# Patient Record
Sex: FEMALE | Race: WHITE | NOT HISPANIC OR LATINO
[De-identification: names, ages, dates, MRNs, and addresses within clinical notes are randomized per-mention and may not be internally consistent; named-entity substitution may affect disease eponyms.]

---

## 2017-01-04 ENCOUNTER — APPOINTMENT (OUTPATIENT)
Dept: RHEUMATOLOGY | Facility: CLINIC | Age: 67
End: 2017-01-04

## 2017-01-04 VITALS
SYSTOLIC BLOOD PRESSURE: 120 MMHG | HEIGHT: 64 IN | WEIGHT: 181 LBS | DIASTOLIC BLOOD PRESSURE: 70 MMHG | BODY MASS INDEX: 30.9 KG/M2

## 2017-01-04 RX ORDER — METHYLPRED ACET/NACL,ISO-OS/PF 80 MG/ML
80 VIAL (ML) INJECTION
Qty: 1 | Refills: 0 | Status: COMPLETED | OUTPATIENT
Start: 2017-01-04

## 2017-01-04 RX ADMIN — METHYLPREDNISOLONE ACETATE MG/ML: 80 INJECTION, SUSPENSION INTRA-ARTICULAR; INTRALESIONAL; INTRAMUSCULAR; SOFT TISSUE at 00:00

## 2017-01-06 ENCOUNTER — MED ADMIN CHARGE (OUTPATIENT)
Age: 67
End: 2017-01-06

## 2017-03-24 ENCOUNTER — APPOINTMENT (OUTPATIENT)
Dept: RHEUMATOLOGY | Facility: CLINIC | Age: 67
End: 2017-03-24

## 2017-03-24 VITALS
OXYGEN SATURATION: 98 % | DIASTOLIC BLOOD PRESSURE: 66 MMHG | SYSTOLIC BLOOD PRESSURE: 108 MMHG | HEIGHT: 64 IN | TEMPERATURE: 98.4 F | HEART RATE: 79 BPM

## 2017-03-24 RX ORDER — METHYLPRED ACET/NACL,ISO-OS/PF 80 MG/ML
80 VIAL (ML) INJECTION
Qty: 1 | Refills: 0 | Status: COMPLETED | OUTPATIENT
Start: 2017-03-24

## 2017-03-24 RX ADMIN — METHYLPREDNISOLONE ACETATE MG/ML: 80 INJECTION, SUSPENSION INTRA-ARTICULAR; INTRALESIONAL; INTRAMUSCULAR; SOFT TISSUE at 00:00

## 2017-04-06 ENCOUNTER — OTHER (OUTPATIENT)
Age: 67
End: 2017-04-06

## 2017-04-28 ENCOUNTER — APPOINTMENT (OUTPATIENT)
Dept: RHEUMATOLOGY | Facility: CLINIC | Age: 67
End: 2017-04-28

## 2017-04-28 VITALS
WEIGHT: 170 LBS | HEART RATE: 86 BPM | DIASTOLIC BLOOD PRESSURE: 72 MMHG | OXYGEN SATURATION: 94 % | TEMPERATURE: 97.7 F | HEIGHT: 64 IN | SYSTOLIC BLOOD PRESSURE: 122 MMHG | BODY MASS INDEX: 29.02 KG/M2

## 2017-04-28 LAB
25(OH)D3 SERPL-MCNC: 56.4 NG/ML
ALBUMIN SERPL ELPH-MCNC: 4.3 G/DL
ALP BLD-CCNC: 84 U/L
ALT SERPL-CCNC: 9 U/L
ANION GAP SERPL CALC-SCNC: 11 MMOL/L
AST SERPL-CCNC: 22 U/L
BASOPHILS # BLD AUTO: 0.02 K/UL
BASOPHILS NFR BLD AUTO: 0.3 %
BILIRUB SERPL-MCNC: 0.2 MG/DL
BUN SERPL-MCNC: 20 MG/DL
CALCIUM SERPL-MCNC: 10.1 MG/DL
CHLORIDE SERPL-SCNC: 99 MMOL/L
CO2 SERPL-SCNC: 32 MMOL/L
CREAT SERPL-MCNC: 0.76 MG/DL
EOSINOPHIL # BLD AUTO: 0.11 K/UL
EOSINOPHIL NFR BLD AUTO: 1.8 %
GLUCOSE SERPL-MCNC: 100 MG/DL
HCT VFR BLD CALC: 42.5 %
HGB BLD-MCNC: 13.7 G/DL
IMM GRANULOCYTES NFR BLD AUTO: 0.2 %
LYMPHOCYTES # BLD AUTO: 2.08 K/UL
LYMPHOCYTES NFR BLD AUTO: 33.2 %
MAN DIFF?: NORMAL
MCHC RBC-ENTMCNC: 30.9 PG
MCHC RBC-ENTMCNC: 32.2 GM/DL
MCV RBC AUTO: 95.9 FL
MONOCYTES # BLD AUTO: 0.63 K/UL
MONOCYTES NFR BLD AUTO: 10.1 %
NEUTROPHILS # BLD AUTO: 3.41 K/UL
NEUTROPHILS NFR BLD AUTO: 54.4 %
PLATELET # BLD AUTO: 311 K/UL
POTASSIUM SERPL-SCNC: 3.9 MMOL/L
PROT SERPL-MCNC: 7.2 G/DL
RBC # BLD: 4.43 M/UL
RBC # FLD: 13.2 %
SODIUM SERPL-SCNC: 142 MMOL/L
WBC # FLD AUTO: 6.26 K/UL

## 2017-04-28 RX ORDER — HYALURONATE SODIUM 10 MG/ML
25 SYRINGE (ML) INTRAARTICULAR
Refills: 0 | Status: COMPLETED | OUTPATIENT
Start: 2017-04-28

## 2017-04-28 RX ADMIN — Medication 2.5 MG/2.5ML: at 00:00

## 2017-05-05 ENCOUNTER — APPOINTMENT (OUTPATIENT)
Dept: RHEUMATOLOGY | Facility: CLINIC | Age: 67
End: 2017-05-05

## 2017-05-05 VITALS
HEART RATE: 82 BPM | SYSTOLIC BLOOD PRESSURE: 140 MMHG | DIASTOLIC BLOOD PRESSURE: 70 MMHG | BODY MASS INDEX: 29.02 KG/M2 | WEIGHT: 170 LBS | HEIGHT: 64 IN | OXYGEN SATURATION: 96 %

## 2017-05-05 RX ORDER — DENOSUMAB 60 MG/ML
60 INJECTION SUBCUTANEOUS
Qty: 1 | Refills: 0 | Status: COMPLETED | OUTPATIENT
Start: 2017-05-05

## 2017-05-05 RX ORDER — HYALURONATE SODIUM 10 MG/ML
25 SYRINGE (ML) INTRAARTICULAR
Refills: 0 | Status: COMPLETED | OUTPATIENT
Start: 2017-05-05

## 2017-05-05 RX ADMIN — Medication 2.5 MG/2.5ML: at 00:00

## 2017-05-05 RX ADMIN — DENOSUMAB 0 MG/ML: 60 INJECTION SUBCUTANEOUS at 00:00

## 2017-05-12 ENCOUNTER — APPOINTMENT (OUTPATIENT)
Dept: RHEUMATOLOGY | Facility: CLINIC | Age: 67
End: 2017-05-12

## 2017-05-12 VITALS
DIASTOLIC BLOOD PRESSURE: 64 MMHG | HEIGHT: 64 IN | BODY MASS INDEX: 29.37 KG/M2 | OXYGEN SATURATION: 97 % | WEIGHT: 172 LBS | HEART RATE: 94 BPM | SYSTOLIC BLOOD PRESSURE: 92 MMHG

## 2017-05-12 RX ORDER — HYALURONATE SODIUM 10 MG/ML
25 SYRINGE (ML) INTRAARTICULAR
Refills: 0 | Status: COMPLETED | OUTPATIENT
Start: 2017-05-12

## 2017-05-12 RX ORDER — METHYLPRED ACET/NACL,ISO-OS/PF 80 MG/ML
80 VIAL (ML) INJECTION
Qty: 1 | Refills: 0 | Status: COMPLETED | OUTPATIENT
Start: 2017-05-12

## 2017-05-12 RX ADMIN — Medication 2.5 MG/2.5ML: at 00:00

## 2017-05-12 RX ADMIN — METHYLPREDNISOLONE ACETATE MG/ML: 80 INJECTION, SUSPENSION INTRA-ARTICULAR; INTRALESIONAL; INTRAMUSCULAR; SOFT TISSUE at 00:00

## 2017-06-26 ENCOUNTER — APPOINTMENT (OUTPATIENT)
Dept: RHEUMATOLOGY | Facility: CLINIC | Age: 67
End: 2017-06-26

## 2017-06-26 VITALS
BODY MASS INDEX: 29.37 KG/M2 | SYSTOLIC BLOOD PRESSURE: 114 MMHG | HEART RATE: 92 BPM | HEIGHT: 64 IN | WEIGHT: 172 LBS | DIASTOLIC BLOOD PRESSURE: 72 MMHG | OXYGEN SATURATION: 97 %

## 2017-06-26 RX ORDER — AZELASTINE HYDROCHLORIDE 137 UG/1
0.1 SPRAY, METERED NASAL
Qty: 90 | Refills: 0 | Status: DISCONTINUED | COMMUNITY
Start: 2017-05-12

## 2017-06-26 RX ORDER — HYDROMORPHONE HYDROCHLORIDE 2 MG/1
2 TABLET ORAL
Qty: 15 | Refills: 0 | Status: DISCONTINUED | COMMUNITY
Start: 2017-01-31

## 2017-06-26 RX ORDER — OXYCODONE AND ACETAMINOPHEN 5; 325 MG/1; MG/1
5-325 TABLET ORAL
Qty: 60 | Refills: 0 | Status: DISCONTINUED | COMMUNITY
Start: 2016-12-29

## 2017-06-26 RX ORDER — METHYLPRED ACET/NACL,ISO-OS/PF 80 MG/ML
80 VIAL (ML) INJECTION
Qty: 1 | Refills: 0 | Status: COMPLETED | OUTPATIENT
Start: 2017-06-26

## 2017-06-26 RX ORDER — AMOXICILLIN 500 MG/1
500 CAPSULE ORAL
Qty: 14 | Refills: 0 | Status: DISCONTINUED | COMMUNITY
Start: 2017-06-01

## 2017-06-26 RX ORDER — CEFUROXIME AXETIL 500 MG/1
500 TABLET ORAL
Qty: 14 | Refills: 0 | Status: DISCONTINUED | COMMUNITY
Start: 2017-06-10

## 2017-06-26 RX ADMIN — METHYLPREDNISOLONE ACETATE MG/ML: 80 INJECTION, SUSPENSION INTRA-ARTICULAR; INTRALESIONAL; INTRAMUSCULAR; SOFT TISSUE at 00:00

## 2017-08-02 ENCOUNTER — APPOINTMENT (OUTPATIENT)
Dept: RHEUMATOLOGY | Facility: CLINIC | Age: 67
End: 2017-08-02
Payer: MEDICARE

## 2017-08-02 VITALS — OXYGEN SATURATION: 98 % | SYSTOLIC BLOOD PRESSURE: 112 MMHG | HEART RATE: 110 BPM | DIASTOLIC BLOOD PRESSURE: 72 MMHG

## 2017-08-02 VITALS — HEART RATE: 78 BPM | SYSTOLIC BLOOD PRESSURE: 114 MMHG | OXYGEN SATURATION: 98 % | DIASTOLIC BLOOD PRESSURE: 68 MMHG

## 2017-08-02 PROCEDURE — 20610 DRAIN/INJ JOINT/BURSA W/O US: CPT | Mod: 50

## 2017-08-02 RX ORDER — METHYLPRED ACET/NACL,ISO-OS/PF 80 MG/ML
80 VIAL (ML) INJECTION
Qty: 1 | Refills: 0 | Status: COMPLETED | OUTPATIENT
Start: 2017-08-02

## 2017-08-02 RX ADMIN — METHYLPREDNISOLONE ACETATE MG/ML: 80 INJECTION, SUSPENSION INTRA-ARTICULAR; INTRALESIONAL; INTRAMUSCULAR; SOFT TISSUE at 00:00

## 2017-09-25 ENCOUNTER — APPOINTMENT (OUTPATIENT)
Dept: RHEUMATOLOGY | Facility: CLINIC | Age: 67
End: 2017-09-25
Payer: MEDICARE

## 2017-09-25 VITALS
OXYGEN SATURATION: 97 % | HEIGHT: 64 IN | HEART RATE: 80 BPM | DIASTOLIC BLOOD PRESSURE: 74 MMHG | WEIGHT: 172 LBS | SYSTOLIC BLOOD PRESSURE: 132 MMHG | BODY MASS INDEX: 29.37 KG/M2

## 2017-09-25 PROCEDURE — 20610 DRAIN/INJ JOINT/BURSA W/O US: CPT | Mod: 50

## 2017-09-25 RX ORDER — METHYLPRED ACET/NACL,ISO-OS/PF 80 MG/ML
80 VIAL (ML) INJECTION
Qty: 1 | Refills: 0 | Status: COMPLETED | OUTPATIENT
Start: 2017-09-25

## 2017-09-25 RX ADMIN — METHYLPREDNISOLONE ACETATE MG/ML: 80 INJECTION, SUSPENSION INTRA-ARTICULAR; INTRALESIONAL; INTRAMUSCULAR; SOFT TISSUE at 00:00

## 2017-09-28 ENCOUNTER — OTHER (OUTPATIENT)
Age: 67
End: 2017-09-28

## 2017-10-20 ENCOUNTER — RX RENEWAL (OUTPATIENT)
Age: 67
End: 2017-10-20

## 2017-11-13 ENCOUNTER — APPOINTMENT (OUTPATIENT)
Dept: RHEUMATOLOGY | Facility: CLINIC | Age: 67
End: 2017-11-13
Payer: MEDICARE

## 2017-11-13 VITALS
DIASTOLIC BLOOD PRESSURE: 72 MMHG | OXYGEN SATURATION: 98 % | SYSTOLIC BLOOD PRESSURE: 136 MMHG | HEIGHT: 64 IN | HEART RATE: 70 BPM

## 2017-11-13 PROCEDURE — 20610 DRAIN/INJ JOINT/BURSA W/O US: CPT | Mod: 50

## 2017-11-13 RX ORDER — SODIUM HYALURONATE INTRA-ARTICULAR SOLN PREF SYR 25 MG/2.5ML 25/2.5 MG/ML
25 SOLUTION PREFILLED SYRINGE INTRAARTICULAR
Refills: 0 | Status: COMPLETED | OUTPATIENT
Start: 2017-11-13

## 2017-11-13 RX ORDER — METHYLPRED ACET/NACL,ISO-OS/PF 80 MG/ML
80 VIAL (ML) INJECTION
Qty: 1 | Refills: 0 | Status: COMPLETED | OUTPATIENT
Start: 2017-11-13

## 2017-11-13 RX ADMIN — SODIUM HYALURONATE INTRA-ARTICULAR SOLN PREF SYR 25 MG/2.5ML 0 MG/2.5ML: 25/2.5 SOLUTION PREFILLED SYRINGE at 00:00

## 2017-11-13 RX ADMIN — METHYLPREDNISOLONE ACETATE MG/ML: 80 INJECTION, SUSPENSION INTRA-ARTICULAR; INTRALESIONAL; INTRAMUSCULAR; SOFT TISSUE at 00:00

## 2017-11-20 ENCOUNTER — APPOINTMENT (OUTPATIENT)
Dept: RHEUMATOLOGY | Facility: CLINIC | Age: 67
End: 2017-11-20
Payer: MEDICARE

## 2017-11-20 VITALS
HEART RATE: 68 BPM | DIASTOLIC BLOOD PRESSURE: 77 MMHG | BODY MASS INDEX: 29.53 KG/M2 | SYSTOLIC BLOOD PRESSURE: 115 MMHG | WEIGHT: 173 LBS | HEIGHT: 64 IN

## 2017-11-20 PROCEDURE — 20610 DRAIN/INJ JOINT/BURSA W/O US: CPT

## 2017-11-20 RX ORDER — SODIUM HYALURONATE INTRA-ARTICULAR SOLN PREF SYR 25 MG/2.5ML 25/2.5 MG/ML
25 SOLUTION PREFILLED SYRINGE INTRAARTICULAR
Refills: 0 | Status: COMPLETED | OUTPATIENT
Start: 2017-11-20

## 2017-11-20 RX ADMIN — SODIUM HYALURONATE INTRA-ARTICULAR SOLN PREF SYR 25 MG/2.5ML 0 MG/2.5ML: 25/2.5 SOLUTION PREFILLED SYRINGE at 00:00

## 2017-11-27 ENCOUNTER — APPOINTMENT (OUTPATIENT)
Dept: RHEUMATOLOGY | Facility: CLINIC | Age: 67
End: 2017-11-27
Payer: MEDICARE

## 2017-11-27 VITALS
BODY MASS INDEX: 29.53 KG/M2 | SYSTOLIC BLOOD PRESSURE: 122 MMHG | DIASTOLIC BLOOD PRESSURE: 72 MMHG | HEIGHT: 64 IN | HEART RATE: 61 BPM | OXYGEN SATURATION: 98 % | WEIGHT: 173 LBS

## 2017-11-27 PROCEDURE — 20610 DRAIN/INJ JOINT/BURSA W/O US: CPT | Mod: 50

## 2017-11-27 PROCEDURE — 96372 THER/PROPH/DIAG INJ SC/IM: CPT | Mod: 59

## 2017-11-27 RX ORDER — DENOSUMAB 60 MG/ML
60 INJECTION SUBCUTANEOUS
Qty: 0 | Refills: 0 | Status: COMPLETED | OUTPATIENT
Start: 2017-11-27

## 2017-11-27 RX ORDER — SODIUM HYALURONATE INTRA-ARTICULAR SOLN PREF SYR 25 MG/2.5ML 25/2.5 MG/ML
25 SOLUTION PREFILLED SYRINGE INTRAARTICULAR
Refills: 0 | Status: COMPLETED | OUTPATIENT
Start: 2017-11-27

## 2017-11-27 RX ADMIN — DENOSUMAB 0 MG/ML: 60 INJECTION SUBCUTANEOUS at 00:00

## 2017-11-27 RX ADMIN — SODIUM HYALURONATE INTRA-ARTICULAR SOLN PREF SYR 25 MG/2.5ML 2.5 MG/2.5ML: 25/2.5 SOLUTION PREFILLED SYRINGE at 00:00

## 2017-12-20 ENCOUNTER — APPOINTMENT (OUTPATIENT)
Dept: RHEUMATOLOGY | Facility: CLINIC | Age: 67
End: 2017-12-20
Payer: MEDICARE

## 2017-12-20 VITALS
OXYGEN SATURATION: 97 % | HEIGHT: 64 IN | TEMPERATURE: 97.8 F | SYSTOLIC BLOOD PRESSURE: 116 MMHG | DIASTOLIC BLOOD PRESSURE: 74 MMHG | HEART RATE: 69 BPM | BODY MASS INDEX: 29.53 KG/M2 | WEIGHT: 173 LBS

## 2017-12-20 PROCEDURE — 20610 DRAIN/INJ JOINT/BURSA W/O US: CPT | Mod: LT

## 2017-12-20 RX ADMIN — METHYLPREDNISOLONE ACETATE MG/ML: 80 INJECTION, SUSPENSION INTRA-ARTICULAR; INTRALESIONAL; INTRAMUSCULAR; SOFT TISSUE at 00:00

## 2017-12-27 RX ORDER — METHYLPRED ACET/NACL,ISO-OS/PF 80 MG/ML
80 VIAL (ML) INJECTION
Qty: 1 | Refills: 0 | Status: COMPLETED | OUTPATIENT
Start: 2017-12-20

## 2018-01-26 ENCOUNTER — APPOINTMENT (OUTPATIENT)
Dept: RHEUMATOLOGY | Facility: CLINIC | Age: 68
End: 2018-01-26
Payer: MEDICARE

## 2018-01-26 VITALS
WEIGHT: 173 LBS | DIASTOLIC BLOOD PRESSURE: 71 MMHG | BODY MASS INDEX: 29.53 KG/M2 | HEART RATE: 94 BPM | HEIGHT: 64 IN | SYSTOLIC BLOOD PRESSURE: 107 MMHG | OXYGEN SATURATION: 97 %

## 2018-01-26 PROCEDURE — 20610 DRAIN/INJ JOINT/BURSA W/O US: CPT | Mod: 50

## 2018-01-26 RX ORDER — METHYLPRED ACET/NACL,ISO-OS/PF 80 MG/ML
80 VIAL (ML) INJECTION
Qty: 1 | Refills: 0 | Status: COMPLETED | OUTPATIENT
Start: 2018-01-26

## 2018-01-26 RX ADMIN — METHYLPREDNISOLONE ACETATE MG/ML: 80 INJECTION, SUSPENSION INTRA-ARTICULAR; INTRALESIONAL; INTRAMUSCULAR; SOFT TISSUE at 00:00

## 2018-02-07 ENCOUNTER — MESSAGE (OUTPATIENT)
Age: 68
End: 2018-02-07

## 2018-04-06 ENCOUNTER — MESSAGE (OUTPATIENT)
Age: 68
End: 2018-04-06

## 2018-04-07 ENCOUNTER — TRANSCRIPTION ENCOUNTER (OUTPATIENT)
Age: 68
End: 2018-04-07

## 2018-04-13 ENCOUNTER — APPOINTMENT (OUTPATIENT)
Dept: UROGYNECOLOGY | Facility: CLINIC | Age: 68
End: 2018-04-13
Payer: MEDICARE

## 2018-04-13 VITALS
DIASTOLIC BLOOD PRESSURE: 68 MMHG | BODY MASS INDEX: 33.13 KG/M2 | SYSTOLIC BLOOD PRESSURE: 124 MMHG | WEIGHT: 187 LBS | HEIGHT: 63 IN

## 2018-04-13 DIAGNOSIS — Z80.1 FAMILY HISTORY OF MALIGNANT NEOPLASM OF TRACHEA, BRONCHUS AND LUNG: ICD-10-CM

## 2018-04-13 DIAGNOSIS — Z82.49 FAMILY HISTORY OF ISCHEMIC HEART DISEASE AND OTHER DISEASES OF THE CIRCULATORY SYSTEM: ICD-10-CM

## 2018-04-13 DIAGNOSIS — Z87.42 PERSONAL HISTORY OF OTHER DISEASES OF THE FEMALE GENITAL TRACT: ICD-10-CM

## 2018-04-13 DIAGNOSIS — I10 ESSENTIAL (PRIMARY) HYPERTENSION: ICD-10-CM

## 2018-04-13 DIAGNOSIS — Z82.3 FAMILY HISTORY OF STROKE: ICD-10-CM

## 2018-04-13 DIAGNOSIS — Z86.018 PERSONAL HISTORY OF OTHER BENIGN NEOPLASM: ICD-10-CM

## 2018-04-13 DIAGNOSIS — Z78.9 OTHER SPECIFIED HEALTH STATUS: ICD-10-CM

## 2018-04-13 DIAGNOSIS — Z87.828 PERSONAL HISTORY OF OTHER (HEALED) PHYSICAL INJURY AND TRAUMA: ICD-10-CM

## 2018-04-13 DIAGNOSIS — Z80.0 FAMILY HISTORY OF MALIGNANT NEOPLASM OF DIGESTIVE ORGANS: ICD-10-CM

## 2018-04-13 LAB
BILIRUB UR QL STRIP: NEGATIVE
CLARITY UR: CLEAR
COLLECTION METHOD: NORMAL
GLUCOSE UR-MCNC: NEGATIVE
HCG UR QL: 0.2 EU/DL
HGB UR QL STRIP.AUTO: NEGATIVE
KETONES UR-MCNC: NEGATIVE
LEUKOCYTE ESTERASE UR QL STRIP: NEGATIVE
NITRITE UR QL STRIP: NEGATIVE
PH UR STRIP: 5.5
PROT UR STRIP-MCNC: NEGATIVE
SP GR UR STRIP: 1.01

## 2018-04-13 PROCEDURE — 51701 INSERT BLADDER CATHETER: CPT

## 2018-04-13 PROCEDURE — 99204 OFFICE O/P NEW MOD 45 MIN: CPT | Mod: 25

## 2018-04-13 PROCEDURE — 81003 URINALYSIS AUTO W/O SCOPE: CPT | Mod: QW

## 2018-04-13 RX ORDER — BACILLUS COAGULANS/INULIN 1B-250 MG
CAPSULE ORAL
Refills: 0 | Status: ACTIVE | COMMUNITY

## 2018-04-16 ENCOUNTER — RESULT REVIEW (OUTPATIENT)
Age: 68
End: 2018-04-16

## 2018-04-18 ENCOUNTER — APPOINTMENT (OUTPATIENT)
Dept: RHEUMATOLOGY | Facility: CLINIC | Age: 68
End: 2018-04-18
Payer: MEDICARE

## 2018-04-18 VITALS
HEIGHT: 63 IN | OXYGEN SATURATION: 98 % | DIASTOLIC BLOOD PRESSURE: 77 MMHG | BODY MASS INDEX: 31.01 KG/M2 | WEIGHT: 175 LBS | HEART RATE: 90 BPM | SYSTOLIC BLOOD PRESSURE: 118 MMHG

## 2018-04-18 PROCEDURE — 20610 DRAIN/INJ JOINT/BURSA W/O US: CPT | Mod: 50

## 2018-04-18 RX ORDER — METHENAMINE HIPPURATE 1 G/1
1 TABLET ORAL
Qty: 60 | Refills: 0 | Status: DISCONTINUED | COMMUNITY
Start: 2018-01-25

## 2018-04-18 RX ORDER — CEFPODOXIME PROXETIL 200 MG/1
200 TABLET, FILM COATED ORAL
Qty: 14 | Refills: 0 | Status: DISCONTINUED | COMMUNITY
Start: 2018-02-07

## 2018-04-18 RX ORDER — NITROFURANTOIN (MONOHYDRATE/MACROCRYSTALS) 25; 75 MG/1; MG/1
100 CAPSULE ORAL
Qty: 14 | Refills: 0 | Status: DISCONTINUED | COMMUNITY
Start: 2018-03-30

## 2018-05-17 ENCOUNTER — MOBILE ON CALL (OUTPATIENT)
Age: 68
End: 2018-05-17

## 2018-05-18 ENCOUNTER — APPOINTMENT (OUTPATIENT)
Dept: RHEUMATOLOGY | Facility: CLINIC | Age: 68
End: 2018-05-18
Payer: MEDICARE

## 2018-05-18 ENCOUNTER — MED ADMIN CHARGE (OUTPATIENT)
Age: 68
End: 2018-05-18

## 2018-05-18 VITALS
SYSTOLIC BLOOD PRESSURE: 133 MMHG | OXYGEN SATURATION: 90 % | HEART RATE: 85 BPM | WEIGHT: 175 LBS | HEIGHT: 63 IN | DIASTOLIC BLOOD PRESSURE: 78 MMHG | BODY MASS INDEX: 31.01 KG/M2

## 2018-05-18 DIAGNOSIS — M70.61 TROCHANTERIC BURSITIS, RIGHT HIP: ICD-10-CM

## 2018-05-18 PROCEDURE — 20610 DRAIN/INJ JOINT/BURSA W/O US: CPT | Mod: RT

## 2018-05-18 RX ORDER — METHYLPRED ACET/NACL,ISO-OS/PF 80 MG/ML
80 VIAL (ML) INJECTION
Qty: 1 | Refills: 0 | Status: COMPLETED | OUTPATIENT
Start: 2018-05-18

## 2018-05-18 RX ADMIN — METHYLPREDNISOLONE ACETATE MG/ML: 80 INJECTION, SUSPENSION INTRA-ARTICULAR; INTRALESIONAL; INTRAMUSCULAR; SOFT TISSUE at 00:00

## 2018-06-04 ENCOUNTER — APPOINTMENT (OUTPATIENT)
Dept: RHEUMATOLOGY | Facility: CLINIC | Age: 68
End: 2018-06-04
Payer: MEDICARE

## 2018-06-04 VITALS
HEART RATE: 69 BPM | DIASTOLIC BLOOD PRESSURE: 74 MMHG | WEIGHT: 175 LBS | SYSTOLIC BLOOD PRESSURE: 130 MMHG | OXYGEN SATURATION: 97 % | BODY MASS INDEX: 31.01 KG/M2 | HEIGHT: 63 IN

## 2018-06-04 PROCEDURE — 99213 OFFICE O/P EST LOW 20 MIN: CPT | Mod: 25

## 2018-06-04 PROCEDURE — 20610 DRAIN/INJ JOINT/BURSA W/O US: CPT | Mod: 50

## 2018-06-04 RX ORDER — SODIUM HYALURONATE INTRA-ARTICULAR SOLN PREF SYR 25 MG/2.5ML 25/2.5 MG/ML
25 SOLUTION PREFILLED SYRINGE INTRAARTICULAR
Refills: 0 | Status: COMPLETED | OUTPATIENT
Start: 2018-06-04

## 2018-06-04 RX ADMIN — SODIUM HYALURONATE INTRA-ARTICULAR SOLN PREF SYR 25 MG/2.5ML 2.5 MG/2.5ML: 25/2.5 SOLUTION PREFILLED SYRINGE at 00:00

## 2018-06-11 ENCOUNTER — APPOINTMENT (OUTPATIENT)
Dept: RHEUMATOLOGY | Facility: CLINIC | Age: 68
End: 2018-06-11
Payer: MEDICARE

## 2018-06-11 VITALS
HEIGHT: 63 IN | WEIGHT: 175 LBS | DIASTOLIC BLOOD PRESSURE: 74 MMHG | BODY MASS INDEX: 31.01 KG/M2 | SYSTOLIC BLOOD PRESSURE: 140 MMHG

## 2018-06-11 PROCEDURE — 20610 DRAIN/INJ JOINT/BURSA W/O US: CPT | Mod: 50

## 2018-06-11 RX ORDER — SODIUM HYALURONATE INTRA-ARTICULAR SOLN PREF SYR 25 MG/2.5ML 25/2.5 MG/ML
25 SOLUTION PREFILLED SYRINGE INTRAARTICULAR
Refills: 0 | Status: COMPLETED | OUTPATIENT
Start: 2018-06-11

## 2018-06-11 RX ADMIN — SODIUM HYALURONATE INTRA-ARTICULAR SOLN PREF SYR 25 MG/2.5ML 0 MG/2.5ML: 25/2.5 SOLUTION PREFILLED SYRINGE at 00:00

## 2018-06-18 ENCOUNTER — APPOINTMENT (OUTPATIENT)
Dept: RHEUMATOLOGY | Facility: CLINIC | Age: 68
End: 2018-06-18
Payer: MEDICARE

## 2018-06-18 VITALS
OXYGEN SATURATION: 97 % | SYSTOLIC BLOOD PRESSURE: 160 MMHG | HEIGHT: 63 IN | DIASTOLIC BLOOD PRESSURE: 88 MMHG | HEART RATE: 80 BPM | BODY MASS INDEX: 31.01 KG/M2 | WEIGHT: 175 LBS

## 2018-06-18 PROCEDURE — 20610 DRAIN/INJ JOINT/BURSA W/O US: CPT | Mod: 50

## 2018-06-18 PROCEDURE — 96372 THER/PROPH/DIAG INJ SC/IM: CPT | Mod: 59

## 2018-06-18 RX ORDER — SODIUM HYALURONATE INTRA-ARTICULAR SOLN PREF SYR 25 MG/2.5ML 25/2.5 MG/ML
25 SOLUTION PREFILLED SYRINGE INTRAARTICULAR
Refills: 0 | Status: COMPLETED | OUTPATIENT
Start: 2018-06-18

## 2018-06-18 RX ORDER — DENOSUMAB 60 MG/ML
60 INJECTION SUBCUTANEOUS
Qty: 0 | Refills: 0 | Status: COMPLETED | OUTPATIENT
Start: 2018-06-18

## 2018-06-18 RX ADMIN — DENOSUMAB 0 MG/ML: 60 INJECTION SUBCUTANEOUS at 00:00

## 2018-06-18 RX ADMIN — SODIUM HYALURONATE INTRA-ARTICULAR SOLN PREF SYR 25 MG/2.5ML 2.5 MG/2.5ML: 25/2.5 SOLUTION PREFILLED SYRINGE at 00:00

## 2018-06-22 ENCOUNTER — RESULT CHARGE (OUTPATIENT)
Age: 68
End: 2018-06-22

## 2018-06-22 ENCOUNTER — APPOINTMENT (OUTPATIENT)
Dept: UROGYNECOLOGY | Facility: CLINIC | Age: 68
End: 2018-06-22
Payer: MEDICARE

## 2018-06-22 VITALS — HEART RATE: 81 BPM | DIASTOLIC BLOOD PRESSURE: 80 MMHG | SYSTOLIC BLOOD PRESSURE: 132 MMHG

## 2018-06-22 LAB
BILIRUB UR QL STRIP: NORMAL
CLARITY UR: CLEAR
COLLECTION METHOD: NORMAL
GLUCOSE UR-MCNC: NEGATIVE
HCG UR QL: 0.2 EU/DL
HGB UR QL STRIP.AUTO: NEGATIVE
KETONES UR-MCNC: NEGATIVE
LEUKOCYTE ESTERASE UR QL STRIP: NEGATIVE
NITRITE UR QL STRIP: NORMAL
PH UR STRIP: 7
PROT UR STRIP-MCNC: NEGATIVE
SP GR UR STRIP: 1.02

## 2018-06-22 PROCEDURE — 52000 CYSTOURETHROSCOPY: CPT

## 2018-06-22 PROCEDURE — 99213 OFFICE O/P EST LOW 20 MIN: CPT | Mod: 25

## 2018-06-26 ENCOUNTER — APPOINTMENT (OUTPATIENT)
Dept: RHEUMATOLOGY | Facility: CLINIC | Age: 68
End: 2018-06-26
Payer: MEDICARE

## 2018-06-26 VITALS
SYSTOLIC BLOOD PRESSURE: 114 MMHG | OXYGEN SATURATION: 98 % | HEART RATE: 92 BPM | DIASTOLIC BLOOD PRESSURE: 64 MMHG | BODY MASS INDEX: 31.01 KG/M2 | HEIGHT: 63 IN | WEIGHT: 175 LBS

## 2018-06-26 PROCEDURE — 20610 DRAIN/INJ JOINT/BURSA W/O US: CPT | Mod: 59,LT

## 2018-06-26 RX ORDER — METHYLPRED ACET/NACL,ISO-OS/PF 80 MG/ML
80 VIAL (ML) INJECTION
Qty: 1 | Refills: 0 | Status: COMPLETED | OUTPATIENT
Start: 2018-06-26

## 2018-06-26 RX ADMIN — METHYLPREDNISOLONE ACETATE MG/ML: 80 INJECTION, SUSPENSION INTRA-ARTICULAR; INTRALESIONAL; INTRAMUSCULAR; SOFT TISSUE at 00:00

## 2018-07-18 ENCOUNTER — APPOINTMENT (OUTPATIENT)
Dept: GASTROENTEROLOGY | Facility: CLINIC | Age: 68
End: 2018-07-18
Payer: MEDICARE

## 2018-07-18 VITALS
SYSTOLIC BLOOD PRESSURE: 130 MMHG | HEART RATE: 79 BPM | WEIGHT: 180 LBS | BODY MASS INDEX: 31.89 KG/M2 | HEIGHT: 63 IN | OXYGEN SATURATION: 98 % | DIASTOLIC BLOOD PRESSURE: 76 MMHG

## 2018-07-18 PROCEDURE — 99214 OFFICE O/P EST MOD 30 MIN: CPT

## 2018-09-17 ENCOUNTER — APPOINTMENT (OUTPATIENT)
Dept: RHEUMATOLOGY | Facility: CLINIC | Age: 68
End: 2018-09-17
Payer: MEDICARE

## 2018-09-17 VITALS
DIASTOLIC BLOOD PRESSURE: 80 MMHG | SYSTOLIC BLOOD PRESSURE: 162 MMHG | HEART RATE: 86 BPM | BODY MASS INDEX: 31.71 KG/M2 | WEIGHT: 179 LBS | HEIGHT: 63 IN | OXYGEN SATURATION: 98 %

## 2018-09-17 PROCEDURE — 99213 OFFICE O/P EST LOW 20 MIN: CPT | Mod: 25

## 2018-09-17 PROCEDURE — 20610 DRAIN/INJ JOINT/BURSA W/O US: CPT | Mod: LT

## 2018-09-17 RX ORDER — GABAPENTIN 100 MG/1
100 CAPSULE ORAL
Qty: 90 | Refills: 3 | Status: DISCONTINUED | COMMUNITY
Start: 2018-06-26 | End: 2018-09-17

## 2018-09-17 RX ORDER — PREDNISONE 5 MG/1
5 TABLET ORAL
Qty: 20 | Refills: 0 | Status: DISCONTINUED | COMMUNITY
Start: 2018-05-18 | End: 2018-09-17

## 2018-09-28 ENCOUNTER — APPOINTMENT (OUTPATIENT)
Dept: UROGYNECOLOGY | Facility: CLINIC | Age: 68
End: 2018-09-28
Payer: MEDICARE

## 2018-09-28 VITALS
DIASTOLIC BLOOD PRESSURE: 74 MMHG | SYSTOLIC BLOOD PRESSURE: 153 MMHG | WEIGHT: 179 LBS | HEIGHT: 63 IN | BODY MASS INDEX: 31.71 KG/M2

## 2018-09-28 LAB
BILIRUB UR QL STRIP: NEGATIVE
CLARITY UR: CLEAR
COLLECTION METHOD: NORMAL
GLUCOSE UR-MCNC: NEGATIVE
HCG UR QL: 0.2 EU/DL
HGB UR QL STRIP.AUTO: NEGATIVE
KETONES UR-MCNC: NEGATIVE
LEUKOCYTE ESTERASE UR QL STRIP: NEGATIVE
NITRITE UR QL STRIP: NEGATIVE
PH UR STRIP: 5.5
PROT UR STRIP-MCNC: NEGATIVE
SP GR UR STRIP: 1.02

## 2018-09-28 PROCEDURE — 81003 URINALYSIS AUTO W/O SCOPE: CPT | Mod: QW

## 2018-09-28 PROCEDURE — 51798 US URINE CAPACITY MEASURE: CPT

## 2018-09-28 PROCEDURE — 99213 OFFICE O/P EST LOW 20 MIN: CPT | Mod: 25

## 2018-12-19 ENCOUNTER — APPOINTMENT (OUTPATIENT)
Dept: RHEUMATOLOGY | Facility: CLINIC | Age: 68
End: 2018-12-19
Payer: MEDICARE

## 2018-12-19 VITALS
WEIGHT: 179 LBS | BODY MASS INDEX: 31.71 KG/M2 | HEIGHT: 63 IN | SYSTOLIC BLOOD PRESSURE: 122 MMHG | DIASTOLIC BLOOD PRESSURE: 79 MMHG | OXYGEN SATURATION: 99 % | HEART RATE: 91 BPM

## 2018-12-19 DIAGNOSIS — M85.80 OTHER SPECIFIED DISORDERS OF BONE DENSITY AND STRUCTURE, UNSPECIFIED SITE: ICD-10-CM

## 2018-12-19 PROCEDURE — 20610 DRAIN/INJ JOINT/BURSA W/O US: CPT | Mod: 50

## 2018-12-19 RX ORDER — SODIUM HYALURONATE INTRA-ARTICULAR SOLN PREF SYR 25 MG/2.5ML 25/2.5 MG/ML
25 SOLUTION PREFILLED SYRINGE INTRAARTICULAR
Refills: 0 | Status: COMPLETED | OUTPATIENT
Start: 2018-12-19

## 2018-12-19 RX ADMIN — SODIUM HYALURONATE INTRA-ARTICULAR SOLN PREF SYR 25 MG/2.5ML 0 MG/2.5ML: 25/2.5 SOLUTION PREFILLED SYRINGE at 00:00

## 2018-12-21 ENCOUNTER — OTHER (OUTPATIENT)
Age: 68
End: 2018-12-21

## 2018-12-21 RX ORDER — DENOSUMAB 60 MG/ML
60 INJECTION SUBCUTANEOUS
Qty: 1 | Refills: 0 | Status: DISCONTINUED | COMMUNITY
Start: 2017-03-24 | End: 2018-12-21

## 2018-12-26 ENCOUNTER — APPOINTMENT (OUTPATIENT)
Dept: RHEUMATOLOGY | Facility: CLINIC | Age: 68
End: 2018-12-26
Payer: MEDICARE

## 2018-12-26 VITALS
OXYGEN SATURATION: 98 % | HEART RATE: 87 BPM | WEIGHT: 179 LBS | SYSTOLIC BLOOD PRESSURE: 115 MMHG | HEIGHT: 63 IN | DIASTOLIC BLOOD PRESSURE: 74 MMHG | BODY MASS INDEX: 31.71 KG/M2

## 2018-12-26 PROCEDURE — 20610 DRAIN/INJ JOINT/BURSA W/O US: CPT | Mod: 50

## 2018-12-26 PROCEDURE — ZZZZZ: CPT

## 2018-12-26 PROCEDURE — 96374 THER/PROPH/DIAG INJ IV PUSH: CPT | Mod: 59

## 2018-12-26 RX ORDER — SODIUM HYALURONATE INTRA-ARTICULAR SOLN PREF SYR 25 MG/2.5ML 25/2.5 MG/ML
25 SOLUTION PREFILLED SYRINGE INTRAARTICULAR
Refills: 0 | Status: COMPLETED | OUTPATIENT
Start: 2018-12-26

## 2018-12-26 RX ADMIN — SODIUM HYALURONATE INTRA-ARTICULAR SOLN PREF SYR 25 MG/2.5ML 0 MG/2.5ML: 25/2.5 SOLUTION PREFILLED SYRINGE at 00:00

## 2019-01-02 ENCOUNTER — APPOINTMENT (OUTPATIENT)
Dept: RHEUMATOLOGY | Facility: CLINIC | Age: 69
End: 2019-01-02
Payer: MEDICARE

## 2019-01-02 VITALS
BODY MASS INDEX: 31.71 KG/M2 | OXYGEN SATURATION: 97 % | SYSTOLIC BLOOD PRESSURE: 120 MMHG | WEIGHT: 179 LBS | DIASTOLIC BLOOD PRESSURE: 70 MMHG | HEART RATE: 84 BPM | HEIGHT: 63 IN

## 2019-01-02 PROCEDURE — 20610 DRAIN/INJ JOINT/BURSA W/O US: CPT | Mod: 50

## 2019-01-02 RX ORDER — SODIUM HYALURONATE INTRA-ARTICULAR SOLN PREF SYR 25 MG/2.5ML 25/2.5 MG/ML
25 SOLUTION PREFILLED SYRINGE INTRAARTICULAR
Refills: 0 | Status: COMPLETED | OUTPATIENT
Start: 2019-01-02

## 2019-01-02 RX ADMIN — SODIUM HYALURONATE INTRA-ARTICULAR SOLN PREF SYR 25 MG/2.5ML 2.5 MG/2.5ML: 25/2.5 SOLUTION PREFILLED SYRINGE at 00:00

## 2019-01-07 ENCOUNTER — APPOINTMENT (OUTPATIENT)
Dept: RHEUMATOLOGY | Facility: CLINIC | Age: 69
End: 2019-01-07

## 2019-01-14 ENCOUNTER — APPOINTMENT (OUTPATIENT)
Dept: RHEUMATOLOGY | Facility: CLINIC | Age: 69
End: 2019-01-14

## 2019-01-18 ENCOUNTER — APPOINTMENT (OUTPATIENT)
Dept: RHEUMATOLOGY | Facility: CLINIC | Age: 69
End: 2019-01-18
Payer: MEDICARE

## 2019-01-18 VITALS
OXYGEN SATURATION: 97 % | SYSTOLIC BLOOD PRESSURE: 120 MMHG | WEIGHT: 180 LBS | DIASTOLIC BLOOD PRESSURE: 81 MMHG | HEART RATE: 97 BPM | BODY MASS INDEX: 31.89 KG/M2 | HEIGHT: 63 IN

## 2019-01-18 PROCEDURE — 20610 DRAIN/INJ JOINT/BURSA W/O US: CPT | Mod: LT,59

## 2019-01-18 NOTE — PROCEDURE
[Today's Date:] : Date: [unfilled] [Patient] : the patient [Risks] : risks [Benefits] : benefits [Alternatives] : alternatives [Consent Obtained] : written consent was obtained prior to the procedure and is detailed in the patient's record [Therapeutic] : therapeutic [#1 Site: ______] : #1 site identified in the [unfilled] [Ethyl Chloride] : ethyl chloride [___ ml Inj] : [unfilled] ~Uml [2%] : 2% [Betadine] : betadine solution [Alcohol] : alcohol [Chlorhexidine] : chlorhexidine [25 gauge 1.5 inch] : A 25 gauge 1.5 inch needle was used [Depomedrol ___ mg] : Depomedrol [unfilled] mg [Tolerated Well] : the patient tolerated the procedure well [No Complications] : there were no complications [Instructions Given] : handouts/patient instructions were given to patient [Patient Instructed to Call] : patient was instructed to call if redness at site, a decrease in range of motion or an increase in pain is noted after procedure. [#2 Site: ___] : # 2 site identified in the [unfilled]

## 2019-01-18 NOTE — REASON FOR VISIT
[Procedure: _________] : a [unfilled] procedure visit [FreeTextEntry1] : left knee and left trochanteric bursa

## 2019-01-18 NOTE — ASSESSMENT
[FreeTextEntry1] : Knee osteoarthritis-\par -I aspirated and injected the left knee with Depo-Medrol 80 mg x1.\par \par \par Left trochanter bursitis-\par -I injected the left trochanter bursa with Depo-Medrol 80 mg x1.\par \par To ice the area\par 2 call if symptoms worsen

## 2019-01-23 ENCOUNTER — APPOINTMENT (OUTPATIENT)
Dept: RHEUMATOLOGY | Facility: CLINIC | Age: 69
End: 2019-01-23

## 2019-04-19 ENCOUNTER — APPOINTMENT (OUTPATIENT)
Dept: RHEUMATOLOGY | Facility: CLINIC | Age: 69
End: 2019-04-19
Payer: MEDICARE

## 2019-04-19 VITALS
BODY MASS INDEX: 31.89 KG/M2 | HEIGHT: 63 IN | HEART RATE: 68 BPM | OXYGEN SATURATION: 97 % | DIASTOLIC BLOOD PRESSURE: 81 MMHG | WEIGHT: 180 LBS | SYSTOLIC BLOOD PRESSURE: 129 MMHG

## 2019-04-19 PROCEDURE — 20610 DRAIN/INJ JOINT/BURSA W/O US: CPT | Mod: 50

## 2019-04-19 NOTE — ASSESSMENT
[FreeTextEntry1] : 68-year-old female with polyarticular osteoarthritis. She presents today for bilateral steroid injections in her knees.\par \par Plan-\par -I aspirated and injected bilateral knees with Depo-Medrol 80 mg x2.\par -She tolerated the procedure well\par -To ice knees today\par -To call if pain persists

## 2019-04-19 NOTE — PROCEDURE
[Today's Date:] : Date: [unfilled] [Patient] : the patient [Risks] : risks [Benefits] : benefits [Alternatives] : alternatives [Therapeutic] : therapeutic [Consent Obtained] : written consent was obtained prior to the procedure and is detailed in the patient's record [#1 Site: ______] : #1 site identified in the [unfilled] [Ethyl Chloride] : ethyl chloride [2%] : 2% [___ ml Inj] : [unfilled] ~Uml [Betadine] : betadine solution [Alcohol] : alcohol [Chlorhexidine] : chlorhexidine [25 gauge 1.5 inch] : A 25 gauge 1.5 inch needle was used [Depomedrol ___ mg] : Depomedrol [unfilled] mg [Tolerated Well] : the patient tolerated the procedure well [No Complications] : there were no complications [Instructions Given] : handouts/patient instructions were given to patient [Patient Instructed to Call] : patient was instructed to call if redness at site, a decrease in range of motion or an increase in pain is noted after procedure.

## 2019-07-03 ENCOUNTER — APPOINTMENT (OUTPATIENT)
Dept: RHEUMATOLOGY | Facility: CLINIC | Age: 69
End: 2019-07-03
Payer: MEDICARE

## 2019-07-03 VITALS
HEART RATE: 76 BPM | DIASTOLIC BLOOD PRESSURE: 76 MMHG | TEMPERATURE: 97.8 F | SYSTOLIC BLOOD PRESSURE: 126 MMHG | WEIGHT: 180 LBS | BODY MASS INDEX: 31.89 KG/M2 | OXYGEN SATURATION: 93 % | HEIGHT: 63 IN

## 2019-07-03 PROCEDURE — 20610 DRAIN/INJ JOINT/BURSA W/O US: CPT | Mod: 50

## 2019-07-03 RX ORDER — SODIUM HYALURONATE INTRA-ARTICULAR SOLN PREF SYR 25 MG/2.5ML 25/2.5 MG/ML
25 SOLUTION PREFILLED SYRINGE INTRAARTICULAR
Refills: 0 | Status: COMPLETED | OUTPATIENT
Start: 2019-07-03

## 2019-07-03 RX ADMIN — SODIUM HYALURONATE INTRA-ARTICULAR SOLN PREF SYR 25 MG/2.5ML 0 MG/2.5ML: 25/2.5 SOLUTION PREFILLED SYRINGE at 00:00

## 2019-07-03 NOTE — ASSESSMENT
[FreeTextEntry1] : Knee osteoarthritis-\par -I aspirated and injected b/l knees with supartz x 2\par - to ice the knees\par - to call if she develops pain

## 2019-07-03 NOTE — PROCEDURE
[Today's Date:] : Date: [unfilled] [Patient] : the patient [Risks] : risks [Benefits] : benefits [Alternatives] : alternatives [Consent Obtained] : written consent was obtained prior to the procedure and is detailed in the patient's record [Therapeutic] : therapeutic [#1 Site: ______] : #1 site identified in the [unfilled] [Ethyl Chloride] : ethyl chloride [___ ml Inj] : [unfilled] ~Uml [2%] : 2% [Betadine] : betadine solution [Alcohol] : alcohol [Chlorhexidine] : chlorhexidine [25 gauge 1.5 inch] : A 25 gauge 1.5 inch needle was used [Tolerated Well] : the patient tolerated the procedure well [Instructions Given] : handouts/patient instructions were given to patient [No Complications] : there were no complications [Patient Instructed to Call] : patient was instructed to call if redness at site, a decrease in range of motion or an increase in pain is noted after procedure. [de-identified] : supartz

## 2019-07-03 NOTE — PROCEDURE
[Today's Date:] : Date: [unfilled] [Patient] : the patient [Risks] : risks [Benefits] : benefits [Alternatives] : alternatives [Consent Obtained] : written consent was obtained prior to the procedure and is detailed in the patient's record [#1 Site: ______] : #1 site identified in the [unfilled] [Therapeutic] : therapeutic [___ ml Inj] : [unfilled] ~Uml [Ethyl Chloride] : ethyl chloride [Alcohol] : alcohol [Betadine] : betadine solution [2%] : 2% [Chlorhexidine] : chlorhexidine [25 gauge 1.5 inch] : A 25 gauge 1.5 inch needle was used [Tolerated Well] : the patient tolerated the procedure well [No Complications] : there were no complications [Instructions Given] : handouts/patient instructions were given to patient [Patient Instructed to Call] : patient was instructed to call if redness at site, a decrease in range of motion or an increase in pain is noted after procedure. [de-identified] : supartz

## 2019-07-03 NOTE — PROCEDURE
[Today's Date:] : Date: [unfilled] [Patient] : the patient [Risks] : risks [Benefits] : benefits [Alternatives] : alternatives [Consent Obtained] : written consent was obtained prior to the procedure and is detailed in the patient's record [#1 Site: ______] : #1 site identified in the [unfilled] [Therapeutic] : therapeutic [Ethyl Chloride] : ethyl chloride [___ ml Inj] : [unfilled] ~Uml [2%] : 2% [Alcohol] : alcohol [Betadine] : betadine solution [Chlorhexidine] : chlorhexidine [25 gauge 1.5 inch] : A 25 gauge 1.5 inch needle was used [Tolerated Well] : the patient tolerated the procedure well [Instructions Given] : handouts/patient instructions were given to patient [No Complications] : there were no complications [Patient Instructed to Call] : patient was instructed to call if redness at site, a decrease in range of motion or an increase in pain is noted after procedure. [de-identified] : supartz

## 2019-07-08 ENCOUNTER — RX RENEWAL (OUTPATIENT)
Age: 69
End: 2019-07-08

## 2019-07-08 ENCOUNTER — OTHER (OUTPATIENT)
Age: 69
End: 2019-07-08

## 2019-07-10 ENCOUNTER — APPOINTMENT (OUTPATIENT)
Dept: RHEUMATOLOGY | Facility: CLINIC | Age: 69
End: 2019-07-10
Payer: MEDICARE

## 2019-07-10 VITALS
HEART RATE: 83 BPM | DIASTOLIC BLOOD PRESSURE: 78 MMHG | BODY MASS INDEX: 31.89 KG/M2 | SYSTOLIC BLOOD PRESSURE: 115 MMHG | WEIGHT: 180 LBS | OXYGEN SATURATION: 94 % | HEIGHT: 63 IN

## 2019-07-10 PROCEDURE — 20610 DRAIN/INJ JOINT/BURSA W/O US: CPT | Mod: 50

## 2019-07-10 RX ORDER — SODIUM HYALURONATE INTRA-ARTICULAR SOLN PREF SYR 25 MG/2.5ML 25/2.5 MG/ML
25 SOLUTION PREFILLED SYRINGE INTRAARTICULAR
Refills: 0 | Status: COMPLETED | OUTPATIENT
Start: 2019-07-10

## 2019-07-10 RX ADMIN — SODIUM HYALURONATE INTRA-ARTICULAR SOLN PREF SYR 25 MG/2.5ML 0 MG/2.5ML: 25/2.5 SOLUTION PREFILLED SYRINGE at 00:00

## 2019-07-10 NOTE — PROCEDURE
[Today's Date:] : Date: [unfilled] [Patient] : the patient [Benefits] : benefits [Risks] : risks [Consent Obtained] : written consent was obtained prior to the procedure and is detailed in the patient's record [Alternatives] : alternatives [Therapeutic] : therapeutic [#1 Site: ______] : #1 site identified in the [unfilled] [___ ml Inj] : [unfilled] ~Uml [Ethyl Chloride] : ethyl chloride [2%] : 2% [Alcohol] : alcohol [Betadine] : betadine solution [Chlorhexidine] : chlorhexidine [25 gauge 1.5 inch] : A 25 gauge 1.5 inch needle was used [No Complications] : there were no complications [Instructions Given] : handouts/patient instructions were given to patient [Tolerated Well] : the patient tolerated the procedure well [de-identified] : supartz [Patient Instructed to Call] : patient was instructed to call if redness at site, a decrease in range of motion or an increase in pain is noted after procedure.

## 2019-07-17 ENCOUNTER — APPOINTMENT (OUTPATIENT)
Dept: RHEUMATOLOGY | Facility: CLINIC | Age: 69
End: 2019-07-17
Payer: MEDICARE

## 2019-07-17 VITALS
WEIGHT: 180 LBS | SYSTOLIC BLOOD PRESSURE: 133 MMHG | DIASTOLIC BLOOD PRESSURE: 78 MMHG | BODY MASS INDEX: 31.89 KG/M2 | HEART RATE: 74 BPM | HEIGHT: 63 IN | OXYGEN SATURATION: 93 %

## 2019-07-17 PROCEDURE — 20610 DRAIN/INJ JOINT/BURSA W/O US: CPT | Mod: 50

## 2019-07-17 RX ORDER — SODIUM HYALURONATE INTRA-ARTICULAR SOLN PREF SYR 25 MG/2.5ML 25/2.5 MG/ML
25 SOLUTION PREFILLED SYRINGE INTRAARTICULAR
Refills: 0 | Status: COMPLETED | OUTPATIENT
Start: 2019-07-17

## 2019-07-17 RX ORDER — METHYLPRED ACET/NACL,ISO-OS/PF 80 MG/ML
80 VIAL (ML) INJECTION
Qty: 1 | Refills: 0 | Status: COMPLETED | OUTPATIENT
Start: 2019-07-17

## 2019-07-17 RX ADMIN — SODIUM HYALURONATE INTRA-ARTICULAR SOLN PREF SYR 25 MG/2.5ML 2.5 MG/2.5ML: 25/2.5 SOLUTION PREFILLED SYRINGE at 00:00

## 2019-07-17 RX ADMIN — METHYLPREDNISOLONE ACETATE MG/ML: 80 INJECTION, SUSPENSION INTRA-ARTICULAR; INTRALESIONAL; INTRAMUSCULAR; SOFT TISSUE at 00:00

## 2019-07-17 NOTE — PROCEDURE
[Today's Date:] : Date: [unfilled] [Patient] : the patient [Risks] : risks [Benefits] : benefits [Alternatives] : alternatives [Consent Obtained] : written consent was obtained prior to the procedure and is detailed in the patient's record [Therapeutic] : therapeutic [#1 Site: ______] : #1 site identified in the [unfilled] [Ethyl Chloride] : ethyl chloride [___ ml Inj] : [unfilled] ~Uml [2%] : 2% [Betadine] : betadine solution [Alcohol] : alcohol [Chlorhexidine] : chlorhexidine [25 gauge 1.5 inch] : A 25 gauge 1.5 inch needle was used [Tolerated Well] : the patient tolerated the procedure well [No Complications] : there were no complications [Instructions Given] : handouts/patient instructions were given to patient [Patient Instructed to Call] : patient was instructed to call if redness at site, a decrease in range of motion or an increase in pain is noted after procedure. [#2 Site: ___] : # 2 site identified in the [unfilled] [de-identified] : supartz

## 2019-07-17 NOTE — ASSESSMENT
[FreeTextEntry1] : Knee osteoarthritis-\par -I aspirated and injected b/l knees with supartz x 2\par - to ice the knees\par - to call if she develops pain\par \par Pes anserine bursitis-\par \par I injected the left bursa with depomedrol 80 mg x 1\par she tolerated the procedure well

## 2019-07-24 ENCOUNTER — APPOINTMENT (OUTPATIENT)
Dept: GASTROENTEROLOGY | Facility: CLINIC | Age: 69
End: 2019-07-24

## 2019-07-31 ENCOUNTER — APPOINTMENT (OUTPATIENT)
Dept: GASTROENTEROLOGY | Facility: CLINIC | Age: 69
End: 2019-07-31
Payer: MEDICARE

## 2019-07-31 VITALS
RESPIRATION RATE: 16 BRPM | OXYGEN SATURATION: 97 % | DIASTOLIC BLOOD PRESSURE: 70 MMHG | SYSTOLIC BLOOD PRESSURE: 130 MMHG | HEART RATE: 77 BPM | WEIGHT: 185 LBS | HEIGHT: 63 IN | BODY MASS INDEX: 32.78 KG/M2 | TEMPERATURE: 97.7 F

## 2019-07-31 PROCEDURE — 99213 OFFICE O/P EST LOW 20 MIN: CPT

## 2019-07-31 NOTE — PHYSICAL EXAM
[General Appearance - Alert] : alert [General Appearance - In No Acute Distress] : in no acute distress [Sclera] : the sclera and conjunctiva were normal [Outer Ear] : the ears and nose were normal in appearance [Heart Rate And Rhythm] : heart rate was normal and rhythm regular [Heart Sounds] : normal S1 and S2 [Auscultation Breath Sounds / Voice Sounds] : lungs were clear to auscultation bilaterally [Heart Sounds Gallop] : no gallops [Murmurs] : no murmurs [Edema] : there was no peripheral edema [Heart Sounds Pericardial Friction Rub] : no pericardial rub [Abdomen Soft] : soft [Bowel Sounds] : normal bowel sounds [Abdomen Tenderness] : non-tender [] : no hepato-splenomegaly [Abdomen Mass (___ Cm)] : no abdominal mass palpated [Skin Color & Pigmentation] : normal skin color and pigmentation [No Focal Deficits] : no focal deficits

## 2019-07-31 NOTE — ASSESSMENT
[FreeTextEntry1] : This is a 69-year-old female with family history of colon cancer and irritable bowel syndrome. She will call next June to schedule a colonoscopy July 2020. She is to call she has any questions or concerns. She is to avoid foods that she knows aggravates her IBS symptoms.

## 2019-07-31 NOTE — HISTORY OF PRESENT ILLNESS
[FreeTextEntry1] : Flaca presents for a followup visit. She states that continued IBS symptoms and that certain foods such as tomato sauce and chamomile tea gives her diarrhea. She denies associated rectal bleeding or melena. She denies weight loss. Review recent blood work from November essentially unremarkable. She has a family history of colon cancer. Her last colonoscopy was July 2015 with diverticulosis.

## 2019-10-21 ENCOUNTER — APPOINTMENT (OUTPATIENT)
Dept: RHEUMATOLOGY | Facility: CLINIC | Age: 69
End: 2019-10-21
Payer: MEDICARE

## 2019-10-21 VITALS
BODY MASS INDEX: 32.78 KG/M2 | HEIGHT: 63 IN | SYSTOLIC BLOOD PRESSURE: 120 MMHG | WEIGHT: 185 LBS | DIASTOLIC BLOOD PRESSURE: 70 MMHG

## 2019-10-21 PROCEDURE — 20610 DRAIN/INJ JOINT/BURSA W/O US: CPT | Mod: LT

## 2019-10-21 RX ORDER — METHYLPRED ACET/NACL,ISO-OS/PF 80 MG/ML
80 VIAL (ML) INJECTION
Qty: 1 | Refills: 0 | Status: COMPLETED | OUTPATIENT
Start: 2019-10-21

## 2019-10-21 RX ADMIN — METHYLPREDNISOLONE ACETATE MG/ML: 80 INJECTION, SUSPENSION INTRA-ARTICULAR; INTRALESIONAL; INTRAMUSCULAR; SOFT TISSUE at 00:00

## 2019-10-21 NOTE — ASSESSMENT
[FreeTextEntry1] : Pt comes in urgently with left knee and left hip pain\par \par Left knee OA-\par I aspirated and injected the left knee with depomedrol 80 mg x1\par \par Left trochanteric bursitis-\par I injected the bursa with depomedrol 80 mg x1 she tolerated the procedure well. \par \par To ice both areas.

## 2019-12-16 ENCOUNTER — APPOINTMENT (OUTPATIENT)
Dept: RHEUMATOLOGY | Facility: CLINIC | Age: 69
End: 2019-12-16
Payer: MEDICARE

## 2019-12-16 VITALS
BODY MASS INDEX: 32.77 KG/M2 | WEIGHT: 185 LBS | TEMPERATURE: 97.8 F | OXYGEN SATURATION: 97 % | DIASTOLIC BLOOD PRESSURE: 70 MMHG | SYSTOLIC BLOOD PRESSURE: 130 MMHG | HEART RATE: 81 BPM

## 2019-12-16 PROCEDURE — 20610 DRAIN/INJ JOINT/BURSA W/O US: CPT | Mod: 59,LT

## 2019-12-16 PROCEDURE — 99213 OFFICE O/P EST LOW 20 MIN: CPT | Mod: 25

## 2019-12-16 RX ORDER — METHYLPRED ACET/NACL,ISO-OS/PF 80 MG/ML
80 VIAL (ML) INJECTION
Qty: 1 | Refills: 0 | Status: COMPLETED | OUTPATIENT
Start: 2019-12-16

## 2019-12-16 RX ADMIN — METHYLPREDNISOLONE ACETATE MG/ML: 80 INJECTION, SUSPENSION INTRA-ARTICULAR; INTRALESIONAL; INTRAMUSCULAR; SOFT TISSUE at 00:00

## 2019-12-16 NOTE — HISTORY OF PRESENT ILLNESS
[FreeTextEntry1] : 69-year-old female with osteoarthritis and osteoporosis. \par She is complaining of pain in her left knee and left hip. The pain in the left knee is much worse than the right. She rates her pain at a 7/10. She tried the Neurontin it did not agree with her. She stopped it. She continues to use NSAIDs on a p.r.n. basis.\par \par She has a good appetite otherwise. She denies fevers or chills or night sweats. She denies fatigue.

## 2019-12-16 NOTE — PHYSICAL EXAM
[General Appearance - Well Nourished] : well nourished [General Appearance - Alert] : alert [General Appearance - Well Developed] : well developed [Sclera] : the sclera and conjunctiva were normal [Oropharynx] : the oropharynx was normal [Outer Ear] : the ears and nose were normal in appearance [Respiration, Rhythm And Depth] : normal respiratory rhythm and effort [Neck Appearance] : the appearance of the neck was normal [Heart Sounds] : normal S1 and S2 [Auscultation Breath Sounds / Voice Sounds] : lungs were clear to auscultation bilaterally [Abdomen Tenderness] : non-tender [Edema] : there was no peripheral edema [Full Pulse] : the pedal pulses are present [FreeTextEntry1] : POM left knee with crepitus, tender left anserine bursa, tender left trochanteric bursa [No Spinal Tenderness] : no spinal tenderness [Motor Exam] : the motor exam was normal [] : no rash [Impaired Insight] : insight and judgment were intact [Affect] : the affect was normal

## 2019-12-16 NOTE — ASSESSMENT
[FreeTextEntry1] : 69-year-old female with polyarticular OA and osteoporosis here for followup.\par \par Left knee OA-\par I injected the left knee with Depo-Medrol 80 mg x1. She tolerated the procedure well.\par \par Left trochanteric bursitis-\par I injected the left trochanteric bursa with Depo-Medrol 80 mg x1. She tolerated the procedure well. She is aware to ice the areas today.\par \par She is aware to call if her symptoms worsen.

## 2019-12-16 NOTE — PROCEDURE
[Today's Date:] : Date: [unfilled] [Patient] : the patient [Benefits] : benefits [Risks] : risks [Alternatives] : alternatives [Consent Obtained] : written consent was obtained prior to the procedure and is detailed in the patient's record [Therapeutic] : therapeutic [#1 Site: ______] : #1 site identified in the [unfilled] [___ ml Inj] : [unfilled] ~Uml [Ethyl Chloride] : ethyl chloride [2%] : 2% [Betadine] : betadine solution [Alcohol] : alcohol [Chlorhexidine] : chlorhexidine [25 gauge 1.5 inch] : A 25 gauge 1.5 inch needle was used [Depomedrol ___ mg] : Depomedrol [unfilled] mg [___ml Visccosupplementation] : [unfilled] ml of viscosupplementation [Tolerated Well] : the patient tolerated the procedure well [No Complications] : there were no complications [Instructions Given] : handouts/patient instructions were given to patient [Patient Instructed to Call] : patient was instructed to call if redness at site, a decrease in range of motion or an increase in pain is noted after procedure. [#2 Site: ___] : # 2 site identified in the [unfilled]

## 2020-01-24 ENCOUNTER — APPOINTMENT (OUTPATIENT)
Dept: RHEUMATOLOGY | Facility: CLINIC | Age: 70
End: 2020-01-24
Payer: MEDICARE

## 2020-01-24 VITALS
HEART RATE: 83 BPM | DIASTOLIC BLOOD PRESSURE: 81 MMHG | BODY MASS INDEX: 32.78 KG/M2 | OXYGEN SATURATION: 94 % | HEIGHT: 63 IN | WEIGHT: 185 LBS | SYSTOLIC BLOOD PRESSURE: 134 MMHG

## 2020-01-24 PROCEDURE — 20610 DRAIN/INJ JOINT/BURSA W/O US: CPT | Mod: 50

## 2020-01-24 RX ORDER — SODIUM HYALURONATE INTRA-ARTICULAR SOLN PREF SYR 25 MG/2.5ML 25/2.5 MG/ML
25 SOLUTION PREFILLED SYRINGE INTRAARTICULAR
Refills: 0 | Status: COMPLETED | OUTPATIENT
Start: 2020-01-24

## 2020-01-24 RX ADMIN — SODIUM HYALURONATE INTRA-ARTICULAR SOLN PREF SYR 25 MG/2.5ML 0 MG/2.5ML: 25/2.5 SOLUTION PREFILLED SYRINGE at 00:00

## 2020-01-24 NOTE — PROCEDURE
[Today's Date:] : Date: [unfilled] [Risks] : risks [Patient] : the patient [Benefits] : benefits [Alternatives] : alternatives [Therapeutic] : therapeutic [Consent Obtained] : written consent was obtained prior to the procedure and is detailed in the patient's record [#1 Site: ______] : #1 site identified in the [unfilled] [Ethyl Chloride] : ethyl chloride [___ ml Inj] : [unfilled] ~Uml [Alcohol] : alcohol [Betadine] : betadine solution [2%] : 2% [25 gauge 1.5 inch] : A 25 gauge 1.5 inch needle was used [Chlorhexidine] : chlorhexidine [Tolerated Well] : the patient tolerated the procedure well [No Complications] : there were no complications [Instructions Given] : handouts/patient instructions were given to patient [Patient Instructed to Call] : patient was instructed to call if redness at site, a decrease in range of motion or an increase in pain is noted after procedure. [de-identified] : supartz

## 2020-01-31 ENCOUNTER — APPOINTMENT (OUTPATIENT)
Dept: RHEUMATOLOGY | Facility: CLINIC | Age: 70
End: 2020-01-31
Payer: MEDICARE

## 2020-01-31 VITALS
OXYGEN SATURATION: 96 % | WEIGHT: 185 LBS | TEMPERATURE: 98.4 F | BODY MASS INDEX: 32.77 KG/M2 | HEART RATE: 74 BPM | DIASTOLIC BLOOD PRESSURE: 70 MMHG | SYSTOLIC BLOOD PRESSURE: 130 MMHG

## 2020-01-31 PROCEDURE — 20610 DRAIN/INJ JOINT/BURSA W/O US: CPT | Mod: 50

## 2020-01-31 RX ORDER — SODIUM HYALURONATE INTRA-ARTICULAR SOLN PREF SYR 25 MG/2.5ML 25/2.5 MG/ML
25 SOLUTION PREFILLED SYRINGE INTRAARTICULAR
Refills: 0 | Status: COMPLETED | OUTPATIENT
Start: 2020-01-31

## 2020-01-31 RX ADMIN — SODIUM HYALURONATE INTRA-ARTICULAR SOLN PREF SYR 25 MG/2.5ML 2.5 MG/2.5ML: 25/2.5 SOLUTION PREFILLED SYRINGE at 00:00

## 2020-01-31 NOTE — PROCEDURE
[Today's Date:] : Date: [unfilled] [Patient] : the patient [Risks] : risks [Benefits] : benefits [Alternatives] : alternatives [Consent Obtained] : written consent was obtained prior to the procedure and is detailed in the patient's record [Therapeutic] : therapeutic [#1 Site: ______] : #1 site identified in the [unfilled] [Ethyl Chloride] : ethyl chloride [___ ml Inj] : [unfilled] ~Uml [2%] : 2% [Betadine] : betadine solution [Alcohol] : alcohol [Chlorhexidine] : chlorhexidine [25 gauge 1.5 inch] : A 25 gauge 1.5 inch needle was used [Tolerated Well] : the patient tolerated the procedure well [No Complications] : there were no complications [Instructions Given] : handouts/patient instructions were given to patient [Patient Instructed to Call] : patient was instructed to call if redness at site, a decrease in range of motion or an increase in pain is noted after procedure. [de-identified] : supartz

## 2020-02-01 LAB
B PERT IGG+IGM PNL SER: CLEAR
COLOR FLD: YELLOW
EOSINOPHIL # FLD MANUAL: 0 %
FLUID INTAKE SUBSTANCE CLASS: NORMAL
LYMPHOCYTES # FLD MANUAL: 19 %
MESOTHL CELL NFR FLD: 0 %
MONOS+MACROS NFR FLD MANUAL: 65 %
NEUTS SEG # FLD MANUAL: 16 %
NRBC # FLD: 0
RBC # FLD MANUAL: 1000 /UL
SYCRY CLARITY: ABNORMAL
SYCRY COLOR: ABNORMAL
SYCRY ID: NORMAL
SYCRY TUBE: NORMAL
TOTAL CELLS COUNTED FLD: 289 /UL
TUBE TYPE: NORMAL
UNIDENT CELLS NFR FLD MANUAL: 0 %
VARIANT LYMPHS # FLD MANUAL: 0 %

## 2020-02-07 ENCOUNTER — APPOINTMENT (OUTPATIENT)
Dept: RHEUMATOLOGY | Facility: CLINIC | Age: 70
End: 2020-02-07
Payer: MEDICARE

## 2020-02-07 VITALS
OXYGEN SATURATION: 97 % | DIASTOLIC BLOOD PRESSURE: 68 MMHG | WEIGHT: 185 LBS | HEART RATE: 84 BPM | BODY MASS INDEX: 32.77 KG/M2 | SYSTOLIC BLOOD PRESSURE: 120 MMHG | TEMPERATURE: 97.8 F

## 2020-02-07 PROCEDURE — 20610 DRAIN/INJ JOINT/BURSA W/O US: CPT | Mod: 50

## 2020-02-07 RX ORDER — SODIUM HYALURONATE INTRA-ARTICULAR SOLN PREF SYR 25 MG/2.5ML 25/2.5 MG/ML
25 SOLUTION PREFILLED SYRINGE INTRAARTICULAR
Refills: 0 | Status: COMPLETED | OUTPATIENT
Start: 2020-02-07

## 2020-02-07 RX ORDER — METHYLPRED ACET/NACL,ISO-OS/PF 80 MG/ML
80 VIAL (ML) INJECTION
Qty: 1 | Refills: 0 | Status: COMPLETED | OUTPATIENT
Start: 2020-02-07

## 2020-02-07 RX ADMIN — SODIUM HYALURONATE INTRA-ARTICULAR SOLN PREF SYR 25 MG/2.5ML 0 MG/2.5ML: 25/2.5 SOLUTION PREFILLED SYRINGE at 00:00

## 2020-02-07 RX ADMIN — METHYLPREDNISOLONE ACETATE MG/ML: 80 INJECTION, SUSPENSION INTRA-ARTICULAR; INTRALESIONAL; INTRAMUSCULAR; SOFT TISSUE at 00:00

## 2020-02-07 NOTE — ASSESSMENT
[FreeTextEntry1] : Knee osteoarthritis-\par -I aspirated and injected b/l knees with supartz x 2\par Left knee has large effusion received depomedrol 80 mg x1 after aspiration, to have MRI to r/o tear\par - to ice the knees\par - to call if she develops pain

## 2020-02-07 NOTE — PROCEDURE
[Today's Date:] : Date: [unfilled] [Patient] : the patient [Risks] : risks [Benefits] : benefits [Alternatives] : alternatives [Consent Obtained] : written consent was obtained prior to the procedure and is detailed in the patient's record [Therapeutic] : therapeutic [#1 Site: ______] : #1 site identified in the [unfilled] [Ethyl Chloride] : ethyl chloride [___ ml Inj] : [unfilled] ~Uml [2%] : 2% [Betadine] : betadine solution [Alcohol] : alcohol [Chlorhexidine] : chlorhexidine [25 gauge 1.5 inch] : A 25 gauge 1.5 inch needle was used [Tolerated Well] : the patient tolerated the procedure well [No Complications] : there were no complications [Instructions Given] : handouts/patient instructions were given to patient [Patient Instructed to Call] : patient was instructed to call if redness at site, a decrease in range of motion or an increase in pain is noted after procedure. [de-identified] : supartz

## 2020-02-08 LAB
B PERT IGG+IGM PNL SER: ABNORMAL
COLOR FLD: YELLOW
EOSINOPHIL # FLD MANUAL: 0 %
FLUID INTAKE SUBSTANCE CLASS: NORMAL
LYMPHOCYTES # FLD MANUAL: 16 %
MESOTHL CELL NFR FLD: 0 %
MONOS+MACROS NFR FLD MANUAL: 56 %
NEUTS SEG # FLD MANUAL: 28 %
NRBC # FLD: 0
RBC # FLD MANUAL: 415 /UL
SYCRY CLARITY: ABNORMAL
SYCRY COLOR: YELLOW
SYCRY ID: NORMAL
SYCRY TUBE: NORMAL
TOTAL CELLS COUNTED FLD: 333 /UL
TUBE TYPE: NORMAL
UNIDENT CELLS NFR FLD MANUAL: 0 %
VARIANT LYMPHS # FLD MANUAL: 0 %

## 2020-02-10 ENCOUNTER — APPOINTMENT (OUTPATIENT)
Dept: MRI IMAGING | Facility: CLINIC | Age: 70
End: 2020-02-10
Payer: MEDICARE

## 2020-02-10 PROCEDURE — 73721 MRI JNT OF LWR EXTRE W/O DYE: CPT | Mod: LT

## 2020-02-13 ENCOUNTER — TRANSCRIPTION ENCOUNTER (OUTPATIENT)
Age: 70
End: 2020-02-13

## 2020-02-17 LAB — BACTERIA FLD CULT: NORMAL

## 2020-02-23 LAB — BACTERIA FLD CULT: NORMAL

## 2020-03-23 ENCOUNTER — APPOINTMENT (OUTPATIENT)
Dept: RHEUMATOLOGY | Facility: CLINIC | Age: 70
End: 2020-03-23
Payer: MEDICARE

## 2020-03-23 VITALS
SYSTOLIC BLOOD PRESSURE: 120 MMHG | OXYGEN SATURATION: 97 % | HEART RATE: 93 BPM | DIASTOLIC BLOOD PRESSURE: 70 MMHG | WEIGHT: 185 LBS | TEMPERATURE: 97 F | BODY MASS INDEX: 32.78 KG/M2 | HEIGHT: 63 IN

## 2020-03-23 PROCEDURE — 99212 OFFICE O/P EST SF 10 MIN: CPT | Mod: 25

## 2020-03-23 PROCEDURE — 20610 DRAIN/INJ JOINT/BURSA W/O US: CPT | Mod: 50

## 2020-03-23 NOTE — ASSESSMENT
[FreeTextEntry1] : 69-year-old female with polyarticular OA and osteoporosis here for followup.\par \par B/l Knee OA-\par I injected the left and right knees with Depo-Medrol 80 mg x2. She tolerated the procedure well.\par \par She is aware to ice the areas today.\par \par She is aware to call if her symptoms worsen.

## 2020-03-23 NOTE — PROCEDURE
[Today's Date:] : Date: [unfilled] [Patient] : the patient [Risks] : risks [Benefits] : benefits [Alternatives] : alternatives [Consent Obtained] : written consent was obtained prior to the procedure and is detailed in the patient's record [Therapeutic] : therapeutic [#1 Site: ______] : #1 site identified in the [unfilled] [Ethyl Chloride] : ethyl chloride [___ ml Inj] : [unfilled] ~Uml [2%] : 2% [Betadine] : betadine solution [Alcohol] : alcohol [Chlorhexidine] : chlorhexidine [25 gauge 1.5 inch] : A 25 gauge 1.5 inch needle was used [Depomedrol ___ mg] : Depomedrol [unfilled] mg [___ml Visccosupplementation] : [unfilled] ml of viscosupplementation [Tolerated Well] : the patient tolerated the procedure well [No Complications] : there were no complications [Instructions Given] : handouts/patient instructions were given to patient [Patient Instructed to Call] : patient was instructed to call if redness at site, a decrease in range of motion or an increase in pain is noted after procedure. [#2 Site: ___] : # 2 site identified in the [unfilled]

## 2020-03-23 NOTE — PHYSICAL EXAM
[General Appearance - Alert] : alert [General Appearance - Well Nourished] : well nourished [General Appearance - Well Developed] : well developed [Sclera] : the sclera and conjunctiva were normal [Outer Ear] : the ears and nose were normal in appearance [Oropharynx] : the oropharynx was normal [Neck Appearance] : the appearance of the neck was normal [Respiration, Rhythm And Depth] : normal respiratory rhythm and effort [Auscultation Breath Sounds / Voice Sounds] : lungs were clear to auscultation bilaterally [Heart Sounds] : normal S1 and S2 [Full Pulse] : the pedal pulses are present [Edema] : there was no peripheral edema [Abdomen Tenderness] : non-tender [No Spinal Tenderness] : no spinal tenderness [FreeTextEntry1] : POM b/l knees [] : no rash [Motor Exam] : the motor exam was normal [Impaired Insight] : insight and judgment were intact [Affect] : the affect was normal

## 2020-03-23 NOTE — HISTORY OF PRESENT ILLNESS
[FreeTextEntry1] : 69-year-old female with osteoarthritis and osteoporosis. \par She is complaining of pain in both her knees.  The pain in the left knee is much worse than the right. She rates her pain at a 7/10. She continues to use NSAIDs on a p.r.n. basis.\par She saw Dr. Abad and was recommended left knee replacement \par She has a good appetite otherwise. She denies fevers or chills or night sweats. She denies fatigue.

## 2020-06-02 ENCOUNTER — APPOINTMENT (OUTPATIENT)
Dept: RHEUMATOLOGY | Facility: CLINIC | Age: 70
End: 2020-06-02
Payer: MEDICARE

## 2020-06-02 VITALS
BODY MASS INDEX: 32.77 KG/M2 | SYSTOLIC BLOOD PRESSURE: 118 MMHG | OXYGEN SATURATION: 98 % | HEART RATE: 81 BPM | TEMPERATURE: 97.8 F | WEIGHT: 185 LBS | DIASTOLIC BLOOD PRESSURE: 68 MMHG

## 2020-06-02 PROCEDURE — 20610 DRAIN/INJ JOINT/BURSA W/O US: CPT | Mod: 50

## 2020-06-02 NOTE — PROCEDURE
[Today's Date:] : Date: [unfilled] [Patient] : the patient [Risks] : risks [Benefits] : benefits [Alternatives] : alternatives [Consent Obtained] : written consent was obtained prior to the procedure and is detailed in the patient's record [Therapeutic] : therapeutic [#1 Site: ______] : #1 site identified in the [unfilled] [Ethyl Chloride] : ethyl chloride [2%] : 2% [___ ml Inj] : [unfilled] ~Uml [Betadine] : betadine solution [Alcohol] : alcohol [Chlorhexidine] : chlorhexidine [25 gauge 1.5 inch] : A 25 gauge 1.5 inch needle was used [Depomedrol ___ mg] : Depomedrol [unfilled] mg [___ml Visccosupplementation] : [unfilled] ml of viscosupplementation [Tolerated Well] : the patient tolerated the procedure well [Instructions Given] : handouts/patient instructions were given to patient [No Complications] : there were no complications [Patient Instructed to Call] : patient was instructed to call if redness at site, a decrease in range of motion or an increase in pain is noted after procedure. [#2 Site: ___] : # 2 site identified in the [unfilled]

## 2020-07-06 ENCOUNTER — APPOINTMENT (OUTPATIENT)
Dept: GASTROENTEROLOGY | Facility: CLINIC | Age: 70
End: 2020-07-06
Payer: MEDICARE

## 2020-07-06 DIAGNOSIS — R13.12 DYSPHAGIA, OROPHARYNGEAL PHASE: ICD-10-CM

## 2020-07-06 DIAGNOSIS — Z12.11 ENCOUNTER FOR SCREENING FOR MALIGNANT NEOPLASM OF COLON: ICD-10-CM

## 2020-07-06 DIAGNOSIS — K58.9 IRRITABLE BOWEL SYNDROME W/OUT DIARRHEA: ICD-10-CM

## 2020-07-06 PROCEDURE — 99213 OFFICE O/P EST LOW 20 MIN: CPT

## 2020-07-06 NOTE — HISTORY OF PRESENT ILLNESS
[Other Location: e.g. Home (Enter Location, City,State)___] : at [unfilled] [Home] : at home, [unfilled] , at the time of the visit. [FreeTextEntry1] : Flaca Presents for a telephonic visit. She states that as long as she watches but she she does not get heartburn or abdominal cramping with diarrhea. For the past year however she has noticed dysphagia for certain foods such as dry chicken or turkey sandwich. She gets heartburn only with certain foods. She gets abdominal pain with cramping and diarrhea with certain foods. No rectal bleeding or melena. Her weight has been stable. She has a family history of colon cancer. Her last colonoscopy was in 2015. [Verbal consent obtained from patient] : the patient, [unfilled]

## 2020-07-06 NOTE — ASSESSMENT
[FreeTextEntry1] : This is a 70-year-old female with occasional GERD symptoms reporting dysphagia for solid foods in the past one year. She also has a family history of colon cancer. I recommend an upper endoscopy and a screening colonoscopy. I explained to her the risks, alternatives and benefits of the procedures. Risk including but not limited to bleeding, perforation, infection adverse medication reaction. Questions were answered. She stated understanding.

## 2020-07-27 DIAGNOSIS — Z01.818 ENCOUNTER FOR OTHER PREPROCEDURAL EXAMINATION: ICD-10-CM

## 2020-07-28 ENCOUNTER — APPOINTMENT (OUTPATIENT)
Dept: DISASTER EMERGENCY | Facility: CLINIC | Age: 70
End: 2020-07-28

## 2020-07-29 LAB — SARS-COV-2 N GENE NPH QL NAA+PROBE: NOT DETECTED

## 2020-07-31 ENCOUNTER — APPOINTMENT (OUTPATIENT)
Dept: GASTROENTEROLOGY | Facility: AMBULATORY MEDICAL SERVICES | Age: 70
End: 2020-07-31
Payer: MEDICARE

## 2020-07-31 PROCEDURE — 43239 EGD BIOPSY SINGLE/MULTIPLE: CPT

## 2020-07-31 PROCEDURE — G0105: CPT

## 2020-08-07 ENCOUNTER — APPOINTMENT (OUTPATIENT)
Dept: RHEUMATOLOGY | Facility: CLINIC | Age: 70
End: 2020-08-07
Payer: MEDICARE

## 2020-08-07 VITALS
HEART RATE: 88 BPM | HEIGHT: 63 IN | TEMPERATURE: 97.3 F | BODY MASS INDEX: 32.78 KG/M2 | DIASTOLIC BLOOD PRESSURE: 76 MMHG | OXYGEN SATURATION: 96 % | SYSTOLIC BLOOD PRESSURE: 112 MMHG | WEIGHT: 185 LBS

## 2020-08-07 PROCEDURE — 20610 DRAIN/INJ JOINT/BURSA W/O US: CPT | Mod: RT

## 2020-08-07 RX ORDER — SODIUM HYALURONATE INTRA-ARTICULAR SOLN PREF SYR 25 MG/2.5ML 25/2.5 MG/ML
25 SOLUTION PREFILLED SYRINGE INTRAARTICULAR
Refills: 0 | Status: COMPLETED | OUTPATIENT
Start: 2020-08-07

## 2020-08-07 RX ADMIN — SODIUM HYALURONATE INTRA-ARTICULAR SOLN PREF SYR 25 MG/2.5ML 2.5 MG/2.5ML: 25/2.5 SOLUTION PREFILLED SYRINGE at 00:00

## 2020-08-07 NOTE — ASSESSMENT
[FreeTextEntry1] : Knee osteoarthritis-\par -I aspirated and injected right  knee with supartz x 1\par - to ice the knees\par - to call if she develops pain

## 2020-08-07 NOTE — PROCEDURE
[Patient] : the patient [Today's Date:] : Date: [unfilled] [Benefits] : benefits [Risks] : risks [Alternatives] : alternatives [Consent Obtained] : written consent was obtained prior to the procedure and is detailed in the patient's record [#1 Site: ______] : #1 site identified in the [unfilled] [Therapeutic] : therapeutic [Ethyl Chloride] : ethyl chloride [___ ml Inj] : [unfilled] ~Uml [2%] : 2% [Betadine] : betadine solution [Chlorhexidine] : chlorhexidine [Alcohol] : alcohol [25 gauge 1.5 inch] : A 25 gauge 1.5 inch needle was used [Tolerated Well] : the patient tolerated the procedure well [No Complications] : there were no complications [Instructions Given] : handouts/patient instructions were given to patient [Patient Instructed to Call] : patient was instructed to call if redness at site, a decrease in range of motion or an increase in pain is noted after procedure. [de-identified] : supartz

## 2020-08-14 ENCOUNTER — APPOINTMENT (OUTPATIENT)
Dept: RHEUMATOLOGY | Facility: CLINIC | Age: 70
End: 2020-08-14
Payer: MEDICARE

## 2020-08-14 VITALS
HEIGHT: 63 IN | SYSTOLIC BLOOD PRESSURE: 120 MMHG | WEIGHT: 185 LBS | BODY MASS INDEX: 32.78 KG/M2 | OXYGEN SATURATION: 96 % | HEART RATE: 72 BPM | TEMPERATURE: 97.3 F | DIASTOLIC BLOOD PRESSURE: 70 MMHG

## 2020-08-14 PROCEDURE — 20610 DRAIN/INJ JOINT/BURSA W/O US: CPT | Mod: RT

## 2020-08-14 RX ORDER — SODIUM HYALURONATE INTRA-ARTICULAR SOLN PREF SYR 25 MG/2.5ML 25/2.5 MG/ML
25 SOLUTION PREFILLED SYRINGE INTRAARTICULAR
Refills: 0 | Status: COMPLETED | OUTPATIENT
Start: 2020-08-14

## 2020-08-14 RX ADMIN — SODIUM HYALURONATE INTRA-ARTICULAR SOLN PREF SYR 25 MG/2.5ML 2.5 MG/2.5ML: 25/2.5 SOLUTION PREFILLED SYRINGE at 00:00

## 2020-08-14 NOTE — PROCEDURE
[Patient] : the patient [Today's Date:] : Date: [unfilled] [Risks] : risks [Benefits] : benefits [Therapeutic] : therapeutic [Alternatives] : alternatives [Consent Obtained] : written consent was obtained prior to the procedure and is detailed in the patient's record [#1 Site: ______] : #1 site identified in the [unfilled] [Ethyl Chloride] : ethyl chloride [___ ml Inj] : [unfilled] ~Uml [Betadine] : betadine solution [2%] : 2% [Chlorhexidine] : chlorhexidine [25 gauge 1.5 inch] : A 25 gauge 1.5 inch needle was used [Alcohol] : alcohol [No Complications] : there were no complications [Tolerated Well] : the patient tolerated the procedure well [Instructions Given] : handouts/patient instructions were given to patient [de-identified] : supartz [Patient Instructed to Call] : patient was instructed to call if redness at site, a decrease in range of motion or an increase in pain is noted after procedure.

## 2020-08-21 ENCOUNTER — APPOINTMENT (OUTPATIENT)
Dept: RHEUMATOLOGY | Facility: CLINIC | Age: 70
End: 2020-08-21
Payer: MEDICARE

## 2020-08-21 VITALS
WEIGHT: 185 LBS | OXYGEN SATURATION: 95 % | DIASTOLIC BLOOD PRESSURE: 76 MMHG | SYSTOLIC BLOOD PRESSURE: 130 MMHG | HEART RATE: 88 BPM | TEMPERATURE: 97.1 F | HEIGHT: 63 IN | BODY MASS INDEX: 32.78 KG/M2

## 2020-08-21 PROCEDURE — 20610 DRAIN/INJ JOINT/BURSA W/O US: CPT | Mod: RT

## 2020-08-21 RX ORDER — SODIUM HYALURONATE INTRA-ARTICULAR SOLN PREF SYR 25 MG/2.5ML 25/2.5 MG/ML
25 SOLUTION PREFILLED SYRINGE INTRAARTICULAR
Refills: 0 | Status: COMPLETED | OUTPATIENT
Start: 2020-08-21

## 2020-08-21 RX ADMIN — SODIUM HYALURONATE INTRA-ARTICULAR SOLN PREF SYR 25 MG/2.5ML 2.5 MG/2.5ML: 25/2.5 SOLUTION PREFILLED SYRINGE at 00:00

## 2020-08-21 NOTE — PROCEDURE
[Today's Date:] : Date: [unfilled] [Patient] : the patient [Risks] : risks [Benefits] : benefits [Alternatives] : alternatives [Consent Obtained] : written consent was obtained prior to the procedure and is detailed in the patient's record [Therapeutic] : therapeutic [#1 Site: ______] : #1 site identified in the [unfilled] [Ethyl Chloride] : ethyl chloride [___ ml Inj] : [unfilled] ~Uml [2%] : 2% [Betadine] : betadine solution [Alcohol] : alcohol [Chlorhexidine] : chlorhexidine [25 gauge 1.5 inch] : A 25 gauge 1.5 inch needle was used [Tolerated Well] : the patient tolerated the procedure well [No Complications] : there were no complications [Instructions Given] : handouts/patient instructions were given to patient [Patient Instructed to Call] : patient was instructed to call if redness at site, a decrease in range of motion or an increase in pain is noted after procedure. [de-identified] : supartz

## 2020-11-13 ENCOUNTER — APPOINTMENT (OUTPATIENT)
Dept: RHEUMATOLOGY | Facility: CLINIC | Age: 70
End: 2020-11-13
Payer: MEDICARE

## 2020-11-13 VITALS
DIASTOLIC BLOOD PRESSURE: 72 MMHG | OXYGEN SATURATION: 96 % | TEMPERATURE: 97.6 F | BODY MASS INDEX: 32.77 KG/M2 | HEART RATE: 88 BPM | WEIGHT: 185 LBS | SYSTOLIC BLOOD PRESSURE: 112 MMHG

## 2020-11-13 PROCEDURE — 20610 DRAIN/INJ JOINT/BURSA W/O US: CPT | Mod: 59,RT

## 2020-11-13 NOTE — ASSESSMENT
[FreeTextEntry1] : Knee osteoarthritis-\par -I aspirated and injected right  knee with depoemdrol 80 mg x 1\par - to ice the knees\par - to call if she develops pain\par \par Trochanter bursitis-\par i injected the left trochanteric bursa with Depo-Medrol 80 mg x1\par She tolerated the procedure well

## 2020-12-22 ENCOUNTER — APPOINTMENT (OUTPATIENT)
Dept: UROGYNECOLOGY | Facility: CLINIC | Age: 70
End: 2020-12-22
Payer: MEDICARE

## 2020-12-22 VITALS — DIASTOLIC BLOOD PRESSURE: 82 MMHG | SYSTOLIC BLOOD PRESSURE: 132 MMHG

## 2020-12-22 PROCEDURE — 51798 US URINE CAPACITY MEASURE: CPT

## 2020-12-22 PROCEDURE — 81003 URINALYSIS AUTO W/O SCOPE: CPT | Mod: QW

## 2020-12-22 PROCEDURE — 99213 OFFICE O/P EST LOW 20 MIN: CPT | Mod: 25

## 2020-12-22 NOTE — PHYSICAL EXAM
[Chaperone Present] : A chaperone was present in the examining room during all aspects of the physical examination [Normal Appearance] : general appearance was normal [Atrophy] : atrophy [2] : 2 [Aa ____] : Aa [unfilled] [Ba ____] : Ba [unfilled] [C ____] : C [unfilled] [GH ____] : GH [unfilled] [PB ____] : PB [unfilled] [TVL ____] : TVL  [unfilled] [Ap ____] : Ap [unfilled] [Bp ____] : Bp [unfilled] [D ____] : D [unfilled] [Normal] : no abnormalities

## 2020-12-22 NOTE — HISTORY OF PRESENT ILLNESS
[FreeTextEntry1] : In August, patient reports being diagnosed with a UTI, culture confirmed, and treated with amoxicillin by her primary OBGYN. Patient reports that a couple of months ago patient began having burning when applying her vaginal estrogen cream and began to decrease the amount she would apply each time. Subsequently patient began experiencing vaginal burning sensation which was relieved with copious hydration. In beginning of December, patient had a temperature at home and went to [facility] for an evaluation and was admitted for a bladder infection, flu/covid negative. Patient reports being admitted for 5 days on Rocephin, K repletion, fluids and finished a course of oral vancomycin x 4 days. Since she was discharged, patient reports headaches, fatigue, soft stools. She was told to discontinue her HCTZ. \par \par Currently, she reports no leaking during coughing, laughing, or increased abdominal pressure. She reports occasionally feeling urge and barely making it to the bathroom. Occasionally she leaks on the way to the bathroom. These symptoms are a little worse than previously reported. Denies any dysuria, hematuria.

## 2020-12-22 NOTE — DISCUSSION/SUMMARY
[FreeTextEntry1] : Flaca was recently admitted for UTI. Her infections have been pretty much under control until this episode. She had stopped the vaginal estrogen because it was burning. She is dealing with some other health issues, adjusting her BP meds, fatigue, headache. We discussed UTI prophylaxis and I suggested she resume the vaginal estrogen and  call us if she gets UTI sx. I asked her to return in 4 weeks to check her urine. Today it was clean. Her prolapse is stable as are her other urinary complaints.

## 2021-01-21 ENCOUNTER — RESULT CHARGE (OUTPATIENT)
Age: 71
End: 2021-01-21

## 2021-01-21 ENCOUNTER — APPOINTMENT (OUTPATIENT)
Dept: UROGYNECOLOGY | Facility: CLINIC | Age: 71
End: 2021-01-21
Payer: MEDICARE

## 2021-01-21 VITALS — DIASTOLIC BLOOD PRESSURE: 75 MMHG | SYSTOLIC BLOOD PRESSURE: 127 MMHG

## 2021-01-21 VITALS — SYSTOLIC BLOOD PRESSURE: 155 MMHG | DIASTOLIC BLOOD PRESSURE: 82 MMHG

## 2021-01-21 LAB
BILIRUB UR QL STRIP: NEGATIVE
CLARITY UR: CLEAR
COLLECTION METHOD: NORMAL
GLUCOSE UR-MCNC: NEGATIVE
HCG UR QL: 0.2 EU/DL
HGB UR QL STRIP.AUTO: NEGATIVE
KETONES UR-MCNC: NEGATIVE
LEUKOCYTE ESTERASE UR QL STRIP: NORMAL
NITRITE UR QL STRIP: NEGATIVE
PH UR STRIP: 5.5
PROT UR STRIP-MCNC: NEGATIVE
SP GR UR STRIP: 1.03

## 2021-01-21 PROCEDURE — 51798 US URINE CAPACITY MEASURE: CPT

## 2021-01-21 PROCEDURE — 99212 OFFICE O/P EST SF 10 MIN: CPT

## 2021-01-21 NOTE — DISCUSSION/SUMMARY
[FreeTextEntry1] : 71 y/o s/p treatmetn for UTI  doing much better no recent infections . she has continued the estrogen cream as instructed states she feels well using the cream . \par Flaca to return in 3 months or sooner if need arises. pt understands and agree.

## 2021-01-21 NOTE — HISTORY OF PRESENT ILLNESS
[FreeTextEntry1] : 71 y/o female arrived for UTI check pt states she has been doing well no current concerns . pt s/p Left total knee replacement September 1,2020 @ Dr. Wolf Joyner last visit November . no recent UTI since admitted to [facility] December 2-6,2020. Flaca denies foul smell no dysuria no frequency, no leakage of urine with laugh cough or sneeze, good urine flow. Currently using estrogen cream x times a week. .

## 2021-01-21 NOTE — PHYSICAL EXAM
[No Acute Distress] : in no acute distress [Well developed] : well developed [Well Nourished] : ~L well nourished [Good Hygeine] : demonstrates good hygeine

## 2021-02-05 ENCOUNTER — RX RENEWAL (OUTPATIENT)
Age: 71
End: 2021-02-05

## 2021-03-05 ENCOUNTER — APPOINTMENT (OUTPATIENT)
Dept: RHEUMATOLOGY | Facility: CLINIC | Age: 71
End: 2021-03-05
Payer: MEDICARE

## 2021-03-05 VITALS
HEIGHT: 63 IN | DIASTOLIC BLOOD PRESSURE: 77 MMHG | TEMPERATURE: 97.3 F | HEART RATE: 82 BPM | OXYGEN SATURATION: 95 % | SYSTOLIC BLOOD PRESSURE: 138 MMHG | WEIGHT: 185 LBS | BODY MASS INDEX: 32.78 KG/M2

## 2021-03-05 PROCEDURE — 20610 DRAIN/INJ JOINT/BURSA W/O US: CPT | Mod: LT

## 2021-03-05 RX ORDER — SODIUM HYALURONATE INTRA-ARTICULAR SOLN PREF SYR 25 MG/2.5ML 25/2.5 MG/ML
25 SOLUTION PREFILLED SYRINGE INTRAARTICULAR
Refills: 0 | Status: COMPLETED | OUTPATIENT
Start: 2021-03-05

## 2021-03-05 RX ADMIN — SODIUM HYALURONATE INTRA-ARTICULAR SOLN PREF SYR 25 MG/2.5ML 0 MG/2.5ML: 25/2.5 SOLUTION PREFILLED SYRINGE at 00:00

## 2021-03-06 NOTE — ASSESSMENT
[FreeTextEntry1] : Left knee OA-\par I aspirated and injected the knee with supartz x1\par \par Left trochanteric bursitis-\par Injected with Depo-Medrol 80 mg x1

## 2021-03-06 NOTE — PROCEDURE
[Today's Date:] : Date: [unfilled] [Patient] : the patient [Risks] : risks [Benefits] : benefits [Alternatives] : alternatives [Consent Obtained] : written consent was obtained prior to the procedure and is detailed in the patient's record [Therapeutic] : therapeutic [#1 Site: ______] : #1 site identified in the [unfilled] [Ethyl Chloride] : ethyl chloride [___ ml Inj] : [unfilled] ~Uml [2%] : 2% [Betadine] : betadine solution [Alcohol] : alcohol [Chlorhexidine] : chlorhexidine [25 gauge 1.5 inch] : A 25 gauge 1.5 inch needle was used [#2 Site: ___] : # 2 site identified in the [unfilled] [de-identified] : supartz

## 2021-03-12 ENCOUNTER — APPOINTMENT (OUTPATIENT)
Dept: RHEUMATOLOGY | Facility: CLINIC | Age: 71
End: 2021-03-12
Payer: MEDICARE

## 2021-03-12 VITALS
OXYGEN SATURATION: 97 % | SYSTOLIC BLOOD PRESSURE: 146 MMHG | DIASTOLIC BLOOD PRESSURE: 79 MMHG | BODY MASS INDEX: 32.78 KG/M2 | HEIGHT: 63 IN | WEIGHT: 185 LBS | HEART RATE: 84 BPM | TEMPERATURE: 97.1 F

## 2021-03-12 PROCEDURE — 20610 DRAIN/INJ JOINT/BURSA W/O US: CPT | Mod: LT,59

## 2021-03-12 RX ORDER — SODIUM HYALURONATE INTRA-ARTICULAR SOLN PREF SYR 25 MG/2.5ML 25/2.5 MG/ML
25 SOLUTION PREFILLED SYRINGE INTRAARTICULAR
Refills: 0 | Status: COMPLETED | OUTPATIENT
Start: 2021-03-12

## 2021-03-12 RX ADMIN — SODIUM HYALURONATE INTRA-ARTICULAR SOLN PREF SYR 25 MG/2.5ML 2.5 MG/2.5ML: 25/2.5 SOLUTION PREFILLED SYRINGE at 00:00

## 2021-03-12 NOTE — ASSESSMENT
[FreeTextEntry1] : Left knee OA-\par I aspirated and injected the knee with supartz x1\par \par Right  trochanteric bursitis-\par Injected with Depo-Medrol 80 mg x1

## 2021-03-12 NOTE — PROCEDURE
[Today's Date:] : Date: [unfilled] [Patient] : the patient [Risks] : risks [Benefits] : benefits [Alternatives] : alternatives [Consent Obtained] : written consent was obtained prior to the procedure and is detailed in the patient's record [Therapeutic] : therapeutic [#1 Site: ______] : #1 site identified in the [unfilled] [Ethyl Chloride] : ethyl chloride [___ ml Inj] : [unfilled] ~Uml [2%] : 2% [Betadine] : betadine solution [Alcohol] : alcohol [Chlorhexidine] : chlorhexidine [25 gauge 1.5 inch] : A 25 gauge 1.5 inch needle was used [#2 Site: ___] : # 2 site identified in the [unfilled] [de-identified] : supartz

## 2021-03-19 ENCOUNTER — APPOINTMENT (OUTPATIENT)
Dept: RHEUMATOLOGY | Facility: CLINIC | Age: 71
End: 2021-03-19
Payer: MEDICARE

## 2021-03-19 VITALS
BODY MASS INDEX: 32.78 KG/M2 | TEMPERATURE: 97.4 F | SYSTOLIC BLOOD PRESSURE: 110 MMHG | OXYGEN SATURATION: 97 % | HEART RATE: 70 BPM | HEIGHT: 63 IN | WEIGHT: 185 LBS | DIASTOLIC BLOOD PRESSURE: 70 MMHG

## 2021-03-19 PROCEDURE — 20610 DRAIN/INJ JOINT/BURSA W/O US: CPT | Mod: LT

## 2021-03-19 RX ORDER — SODIUM HYALURONATE INTRA-ARTICULAR SOLN PREF SYR 25 MG/2.5ML 25/2.5 MG/ML
25 SOLUTION PREFILLED SYRINGE INTRAARTICULAR
Refills: 0 | Status: COMPLETED | OUTPATIENT
Start: 2021-03-19

## 2021-03-19 RX ADMIN — SODIUM HYALURONATE INTRA-ARTICULAR SOLN PREF SYR 25 MG/2.5ML 2.5 MG/2.5ML: 25/2.5 SOLUTION PREFILLED SYRINGE at 00:00

## 2021-03-22 NOTE — PROCEDURE
[Today's Date:] : Date: [unfilled] [Patient] : the patient [Risks] : risks [Benefits] : benefits [Alternatives] : alternatives [Consent Obtained] : written consent was obtained prior to the procedure and is detailed in the patient's record [Therapeutic] : therapeutic [#1 Site: ______] : #1 site identified in the [unfilled] [Ethyl Chloride] : ethyl chloride [___ ml Inj] : [unfilled] ~Uml [2%] : 2% [Betadine] : betadine solution [Alcohol] : alcohol [Chlorhexidine] : chlorhexidine [25 gauge 1.5 inch] : A 25 gauge 1.5 inch needle was used [de-identified] : supartz

## 2021-04-13 ENCOUNTER — APPOINTMENT (OUTPATIENT)
Dept: UROGYNECOLOGY | Facility: CLINIC | Age: 71
End: 2021-04-13
Payer: MEDICARE

## 2021-04-13 ENCOUNTER — RESULT CHARGE (OUTPATIENT)
Age: 71
End: 2021-04-13

## 2021-04-13 VITALS — SYSTOLIC BLOOD PRESSURE: 138 MMHG | DIASTOLIC BLOOD PRESSURE: 75 MMHG

## 2021-04-13 LAB
APPEARANCE: CLEAR
BACTERIA UR CULT: NORMAL
BACTERIA: NEGATIVE
BILIRUB UR QL STRIP: NEGATIVE
BILIRUBIN URINE: NEGATIVE
BLOOD URINE: NEGATIVE
CLARITY UR: CLEAR
COLLECTION METHOD: NORMAL
COLOR: YELLOW
CORE LAB FLUID CYTOLOGY: NORMAL
GLUCOSE QUALITATIVE U: NEGATIVE MG/DL
GLUCOSE UR-MCNC: NEGATIVE
HCG UR QL: 0.2 EU/DL
HGB UR QL STRIP.AUTO: NEGATIVE
KETONES UR-MCNC: NEGATIVE
KETONES URINE: NEGATIVE
LEUKOCYTE ESTERASE UR QL STRIP: NORMAL
LEUKOCYTE ESTERASE URINE: NEGATIVE
MICROSCOPIC-UA: NORMAL
NITRITE UR QL STRIP: NEGATIVE
NITRITE URINE: NEGATIVE
PH UR STRIP: 6
PH URINE: 6
PROT UR STRIP-MCNC: NEGATIVE
PROTEIN URINE: NEGATIVE MG/DL
RED BLOOD CELLS URINE: 1 /HPF
SP GR UR STRIP: 1.02
SPECIFIC GRAVITY URINE: 1.01
SQUAMOUS EPITHELIAL CELLS: 0 /HPF
UROBILINOGEN URINE: NEGATIVE MG/DL
WHITE BLOOD CELLS URINE: 0 /HPF

## 2021-04-13 PROCEDURE — 99212 OFFICE O/P EST SF 10 MIN: CPT

## 2021-04-13 PROCEDURE — 51798 US URINE CAPACITY MEASURE: CPT

## 2021-04-13 NOTE — ASSESSMENT
[FreeTextEntry1] : 71 y/o female doing well with current treatment no recurrent uti's since last visit, pt will continue as planned call if need arise. return in 6 months .

## 2021-04-13 NOTE — HISTORY OF PRESENT ILLNESS
[FreeTextEntry1] : 69 y/o s/p pfizer vaccine doing well no current uti symptoms denied foul smelling urine , dysuria, incontinence, frequency or fever. currently using estrogen cream pea size to finger tip applied vaginally and taking cranberry pills daily.

## 2021-05-20 ENCOUNTER — NON-APPOINTMENT (OUTPATIENT)
Age: 71
End: 2021-05-20

## 2021-05-20 ENCOUNTER — APPOINTMENT (OUTPATIENT)
Dept: RHEUMATOLOGY | Facility: CLINIC | Age: 71
End: 2021-05-20
Payer: MEDICARE

## 2021-05-20 VITALS
WEIGHT: 182 LBS | HEIGHT: 63 IN | HEART RATE: 88 BPM | SYSTOLIC BLOOD PRESSURE: 124 MMHG | BODY MASS INDEX: 32.25 KG/M2 | DIASTOLIC BLOOD PRESSURE: 80 MMHG | OXYGEN SATURATION: 98 % | TEMPERATURE: 97.6 F

## 2021-05-20 PROCEDURE — 20610 DRAIN/INJ JOINT/BURSA W/O US: CPT | Mod: RT

## 2021-05-20 RX ORDER — METHYLPRED ACET/NACL,ISO-OS/PF 80 MG/ML
80 VIAL (ML) INJECTION
Qty: 1 | Refills: 0 | Status: COMPLETED | OUTPATIENT
Start: 2021-05-20

## 2021-05-20 RX ADMIN — METHYLPREDNISOLONE ACETATE 0 MG/ML: 80 INJECTION, SUSPENSION INTRA-ARTICULAR; INTRALESIONAL; INTRAMUSCULAR; SOFT TISSUE at 00:00

## 2021-05-20 NOTE — PROCEDURE
[FreeTextEntry1] : Procedure: Right knee CS injection\par Date: 5/20/21\par The procedure risks was discussed with the patient.\par Indications: therapeutic purposes \par #1 site identified in the Right knee . Verbal consent was obtained. \par Anesthesia was with ethyl chloride.\par The patient was prepped with betadine solution. \par A 25 gauge 1.5 inch needle was used.\par Injectables: Depomedrol 80 mg was injected into the site The Depomedrol was mixed with 1mL of xylocaine 1%. \par The patient tolerated the procedure well..\par There were no complications. Handouts/patient instructions were given to patient . patient was instructed to call if redness at site, a decrease in range of motion or an increase in pain is noted after procedure. \par  \par \par

## 2021-06-03 ENCOUNTER — APPOINTMENT (OUTPATIENT)
Dept: RHEUMATOLOGY | Facility: CLINIC | Age: 71
End: 2021-06-03

## 2021-09-23 ENCOUNTER — APPOINTMENT (OUTPATIENT)
Dept: RHEUMATOLOGY | Facility: CLINIC | Age: 71
End: 2021-09-23
Payer: MEDICARE

## 2021-09-23 VITALS
TEMPERATURE: 97.2 F | OXYGEN SATURATION: 97 % | SYSTOLIC BLOOD PRESSURE: 112 MMHG | DIASTOLIC BLOOD PRESSURE: 79 MMHG | HEART RATE: 93 BPM | HEIGHT: 63 IN | BODY MASS INDEX: 32.78 KG/M2 | WEIGHT: 185 LBS

## 2021-09-23 PROCEDURE — 99213 OFFICE O/P EST LOW 20 MIN: CPT | Mod: 25

## 2021-09-23 PROCEDURE — 20610 DRAIN/INJ JOINT/BURSA W/O US: CPT | Mod: 59,RT

## 2021-09-23 RX ORDER — METHYLPRED ACET/NACL,ISO-OS/PF 80 MG/ML
80 VIAL (ML) INJECTION
Qty: 1 | Refills: 0 | Status: COMPLETED | OUTPATIENT
Start: 2021-09-23

## 2021-09-23 RX ORDER — POLYETHYLENE GLYCOL 400 AND PROPYLENE GLYCOL 4; 3 MG/ML; MG/ML
SOLUTION/ DROPS OPHTHALMIC
Refills: 0 | Status: ACTIVE | COMMUNITY

## 2021-09-23 RX ORDER — SODIUM HYALURONATE INTRA-ARTICULAR SOLN PREF SYR 25 MG/2.5ML 25/2.5 MG/ML
25 SOLUTION PREFILLED SYRINGE INTRAARTICULAR
Refills: 0 | Status: COMPLETED | OUTPATIENT
Start: 2021-09-23

## 2021-09-23 RX ADMIN — SODIUM HYALURONATE INTRA-ARTICULAR SOLN PREF SYR 25 MG/2.5ML 0 MG/2.5ML: 25/2.5 SOLUTION PREFILLED SYRINGE at 00:00

## 2021-09-23 RX ADMIN — METHYLPREDNISOLONE ACETATE 0 MG/ML: 80 INJECTION, SUSPENSION INTRA-ARTICULAR; INTRALESIONAL; INTRAMUSCULAR; SOFT TISSUE at 00:00

## 2021-09-24 ENCOUNTER — APPOINTMENT (OUTPATIENT)
Dept: RHEUMATOLOGY | Facility: CLINIC | Age: 71
End: 2021-09-24

## 2021-09-30 ENCOUNTER — APPOINTMENT (OUTPATIENT)
Dept: RHEUMATOLOGY | Facility: CLINIC | Age: 71
End: 2021-09-30
Payer: MEDICARE

## 2021-09-30 VITALS
WEIGHT: 185 LBS | BODY MASS INDEX: 32.78 KG/M2 | SYSTOLIC BLOOD PRESSURE: 123 MMHG | OXYGEN SATURATION: 91 % | HEART RATE: 75 BPM | TEMPERATURE: 97.4 F | HEIGHT: 63 IN | DIASTOLIC BLOOD PRESSURE: 82 MMHG

## 2021-09-30 DIAGNOSIS — M70.50 OTHER BURSITIS OF KNEE, UNSPECIFIED KNEE: ICD-10-CM

## 2021-09-30 PROCEDURE — 20610 DRAIN/INJ JOINT/BURSA W/O US: CPT | Mod: RT,59

## 2021-09-30 RX ORDER — NYSTATIN 100000 [USP'U]/G
100000 POWDER TOPICAL TWICE DAILY
Qty: 30 | Refills: 0 | Status: DISCONTINUED | COMMUNITY
Start: 2017-10-26 | End: 2021-09-30

## 2021-09-30 RX ORDER — PHENAZOPYRIDINE HYDROCHLORIDE 100 MG/1
100 TABLET ORAL
Qty: 6 | Refills: 0 | Status: DISCONTINUED | COMMUNITY
Start: 2018-01-23 | End: 2021-09-30

## 2021-09-30 RX ORDER — MELOXICAM 7.5 MG/1
7.5 TABLET ORAL DAILY
Qty: 60 | Refills: 2 | Status: DISCONTINUED | COMMUNITY
Start: 2017-08-02 | End: 2021-09-30

## 2021-09-30 RX ORDER — SULFAMETHOXAZOLE AND TRIMETHOPRIM 800; 160 MG/1; MG/1
800-160 TABLET ORAL TWICE DAILY
Qty: 6 | Refills: 0 | Status: DISCONTINUED | COMMUNITY
Start: 2021-05-28 | End: 2021-09-30

## 2021-09-30 RX ORDER — ZOLEDRONIC ACID 5 MG/100ML
5 INJECTION INTRAVENOUS
Qty: 1 | Refills: 0 | Status: DISCONTINUED | COMMUNITY
Start: 2018-12-19 | End: 2021-09-30

## 2021-09-30 RX ORDER — LINACLOTIDE 145 UG/1
145 CAPSULE, GELATIN COATED ORAL
Qty: 30 | Refills: 5 | Status: DISCONTINUED | COMMUNITY
Start: 2018-07-18 | End: 2021-09-30

## 2021-09-30 RX ORDER — NEOMYCIN AND POLYMYXIN B SULFATES AND DEXAMETHASONE 3.5; 10000; 1 MG/G; [IU]/G; MG/G
3.5-10000-0.1 OINTMENT OPHTHALMIC
Qty: 4 | Refills: 0 | Status: DISCONTINUED | COMMUNITY
Start: 2017-08-30 | End: 2021-09-30

## 2021-09-30 RX ORDER — ESTRADIOL 10 UG/1
10 TABLET VAGINAL
Refills: 0 | Status: DISCONTINUED | COMMUNITY
End: 2021-09-30

## 2021-09-30 RX ORDER — SODIUM SULFATE, POTASSIUM SULFATE, MAGNESIUM SULFATE 17.5; 3.13; 1.6 G/ML; G/ML; G/ML
17.5-3.13-1.6 SOLUTION, CONCENTRATE ORAL
Qty: 1 | Refills: 0 | Status: DISCONTINUED | COMMUNITY
Start: 2020-07-06 | End: 2021-09-30

## 2021-09-30 RX ORDER — NYSTATIN AND TRIAMCINOLONE ACETONIDE 100000; 1 MG/G; MG/G
100000-0.1 CREAM TOPICAL
Qty: 60 | Refills: 0 | Status: DISCONTINUED | COMMUNITY
Start: 2018-01-23 | End: 2021-09-30

## 2021-09-30 RX ORDER — PHENAZOPYRIDINE HYDROCHLORIDE 200 MG/1
200 TABLET ORAL
Qty: 6 | Refills: 0 | Status: DISCONTINUED | COMMUNITY
Start: 2018-01-13 | End: 2021-09-30

## 2021-09-30 RX ORDER — SODIUM HYALURONATE INTRA-ARTICULAR SOLN PREF SYR 25 MG/2.5ML 25/2.5 MG/ML
25 SOLUTION PREFILLED SYRINGE INTRAARTICULAR
Refills: 0 | Status: COMPLETED | OUTPATIENT
Start: 2021-09-30

## 2021-09-30 RX ADMIN — SODIUM HYALURONATE INTRA-ARTICULAR SOLN PREF SYR 25 MG/2.5ML 0 MG/2.5ML: 25/2.5 SOLUTION PREFILLED SYRINGE at 00:00

## 2021-09-30 NOTE — ASSESSMENT
[FreeTextEntry1] : 71-year-old female with polyarticular OA and osteoporosis here for followup.\par \par B/l Knee OA-\par I injected the  right knee with supartz today 9/23/21. She tolerated the procedure well. f/u 1 week for 2nd of 3 supartz injections\par Left knee replaced\par Refill diclofenac gel.  \par She is aware to ice the areas today.\par \par Right GTB\par Right GTB injection today 9/23/21\par \par She may have right PAB injected next week with the right IA supartz injection\par \par She is aware to call if her symptoms worsen. \par \par

## 2021-09-30 NOTE — PROCEDURE
[FreeTextEntry1] : Date: 9/23/21\par Procedure: R Supartz injections\par \par The procedure risks were discussed with the patient.\par Indications: Therapeutic purposes\par #1 site identified in the Right knee.  Verbal consent was obtained.\par The patient was prepped with butadiene solution.\par Topical Anesthesia was with ethyl chloride.\par A 21 Gauge 1.5 inch needle was used. 1mL of 1% xylocaine was used for local anesthetic.  \par Injectables: Supartz 25mg\par The patient tolerated the procedure well.  Identical procedure repeated in left knee.  \par There were no complications.  Handouts/patient instructions were given to patient.  Patient was instructed to call if redness at site, a decrease in range of motion, or significantly increased amount of pain is noted after the procedure.\par \par #2\par Procedure: \par Date: 09/23/21\par The procedure risks was discussed with the patient.\par Indications: therapeutic purposes \par #1 site identified in the Right GTB . Verbal consent was obtained. \par Anesthesia was with ethyl chloride.\par The patient was prepped with betadine solution. \par A 25 gauge 1.5 inch needle was used.\par Injectables: Depomedrol 80 mg was injected into the site The Depomedrol was mixed with 1mL of xylocaine 1%. \par The patient tolerated the procedure well..\par There were no complications. Handouts/patient instructions were given to patient . patient was instructed to call if redness at site, a decrease in range of motion or an increase in pain is noted after procedure. \par  \par \par \par

## 2021-09-30 NOTE — HISTORY OF PRESENT ILLNESS
[FreeTextEntry1] : Patient is here for her right knee Supartz injection, but also c/o pain in right outer hip area.  \par She is wondering if she can also get a bursa injection in addition to her gel shot today.  \par She also reports b/L inner medial knee pain usually worsened upon pressing on the area or with walking.  \par \par She would also like a refill on her topical diclofenac gel. \par Pain is a 5/10 in intensity.  \par \par

## 2021-10-01 ENCOUNTER — APPOINTMENT (OUTPATIENT)
Dept: RHEUMATOLOGY | Facility: CLINIC | Age: 71
End: 2021-10-01

## 2021-10-07 ENCOUNTER — APPOINTMENT (OUTPATIENT)
Dept: RHEUMATOLOGY | Facility: CLINIC | Age: 71
End: 2021-10-07
Payer: MEDICARE

## 2021-10-07 VITALS
HEIGHT: 63 IN | WEIGHT: 185 LBS | HEART RATE: 81 BPM | OXYGEN SATURATION: 96 % | BODY MASS INDEX: 32.78 KG/M2 | DIASTOLIC BLOOD PRESSURE: 77 MMHG | TEMPERATURE: 96.7 F | SYSTOLIC BLOOD PRESSURE: 128 MMHG

## 2021-10-07 PROCEDURE — 20610 DRAIN/INJ JOINT/BURSA W/O US: CPT | Mod: RT

## 2021-10-07 NOTE — PROCEDURE
[FreeTextEntry1] : Date: 10/7/21\par Procedure: R Supartz injections\par \par The procedure risks were discussed with the patient.\par Indications: Therapeutic purposes\par #1 site identified in the Right knee. Verbal consent was obtained.\par The patient was prepped with butadiene solution.\par Topical Anesthesia was with ethyl chloride.\par A 21 Gauge 1.5 inch needle was used. 1mL of 1% xylocaine was used for local anesthetic. \par Injectables: Supartz 25mg\par The patient tolerated the procedure well. Identical procedure repeated in left knee. \par There were no complications. Handouts/patient instructions were given to patient. Patient was instructed to call if redness at site, a decrease in range of motion, or significantly increased amount of pain is noted after the procedure.\par

## 2021-10-08 ENCOUNTER — APPOINTMENT (OUTPATIENT)
Dept: RHEUMATOLOGY | Facility: CLINIC | Age: 71
End: 2021-10-08

## 2021-10-11 ENCOUNTER — APPOINTMENT (OUTPATIENT)
Dept: UROGYNECOLOGY | Facility: CLINIC | Age: 71
End: 2021-10-11

## 2021-10-12 ENCOUNTER — APPOINTMENT (OUTPATIENT)
Dept: UROGYNECOLOGY | Facility: CLINIC | Age: 71
End: 2021-10-12
Payer: MEDICARE

## 2021-10-12 ENCOUNTER — RESULT CHARGE (OUTPATIENT)
Age: 71
End: 2021-10-12

## 2021-10-12 VITALS — SYSTOLIC BLOOD PRESSURE: 119 MMHG | DIASTOLIC BLOOD PRESSURE: 72 MMHG | TEMPERATURE: 97.7 F

## 2021-10-12 LAB
BILIRUB UR QL STRIP: NORMAL
GLUCOSE UR-MCNC: NORMAL
HCG UR QL: 0.2 EU/DL
HGB UR QL STRIP.AUTO: NORMAL
KETONES UR-MCNC: NORMAL
LEUKOCYTE ESTERASE UR QL STRIP: NORMAL
NITRITE UR QL STRIP: NORMAL
PH UR STRIP: 5.5
PROT UR STRIP-MCNC: NORMAL
SP GR UR STRIP: 1.02

## 2021-10-12 PROCEDURE — 99212 OFFICE O/P EST SF 10 MIN: CPT

## 2021-10-12 NOTE — PHYSICAL EXAM
[No Acute Distress] : in no acute distress [Well developed] : well developed [Well Nourished] : ~L well nourished [Good Hygeine] : demonstrates good hygeine [Oriented x3] : oriented to person, place, and time [Normal Memory] : ~T memory was ~L unimpaired [Normal Mood/Affect] : mood and affect are normal [Post Void Residual ____ml] : post void residual was [unfilled] ml

## 2021-10-12 NOTE — ASSESSMENT
[FreeTextEntry1] : 70 y/o female doing well happy with current status . pt will continue current regimen . she will call if need arises. \par Flaca states she will scheduled her booster shot prior to her knee surgery this January. \par

## 2021-10-12 NOTE — HISTORY OF PRESENT ILLNESS
[FreeTextEntry1] : 70 y/o female arrived for medication follow up doing well no current concerns, no recent infections last  5/2021, denies dysuria, foul smell incontinence or frequency or fever. currently using estrogen mwf pea size to finger tip applied vaginally  as well as cranberry pills 500 mg daily. 
[FreeTextEntry1] : 72 y/o female arrived for medication follow up doing well no current concerns, no recent infections last  5/2021, denies dysuria, foul smell incontinence or frequency or fever. currently using estrogen mwf pea size to finger tip applied vaginally  as well as cranberry pills 500 mg daily. 
today

## 2021-10-15 ENCOUNTER — NON-APPOINTMENT (OUTPATIENT)
Age: 71
End: 2021-10-15

## 2021-10-21 ENCOUNTER — NON-APPOINTMENT (OUTPATIENT)
Age: 71
End: 2021-10-21

## 2021-11-09 ENCOUNTER — APPOINTMENT (OUTPATIENT)
Dept: UROGYNECOLOGY | Facility: CLINIC | Age: 71
End: 2021-11-09
Payer: MEDICARE

## 2021-11-09 ENCOUNTER — RESULT CHARGE (OUTPATIENT)
Age: 71
End: 2021-11-09

## 2021-11-09 VITALS — SYSTOLIC BLOOD PRESSURE: 148 MMHG | DIASTOLIC BLOOD PRESSURE: 82 MMHG

## 2021-11-09 LAB
BILIRUB UR QL STRIP: NEGATIVE
CLARITY UR: CLEAR
COLLECTION METHOD: NORMAL
GLUCOSE UR-MCNC: NEGATIVE
HCG UR QL: 0.2 EU/DL
HGB UR QL STRIP.AUTO: NORMAL
KETONES UR-MCNC: NEGATIVE
LEUKOCYTE ESTERASE UR QL STRIP: NORMAL
NITRITE UR QL STRIP: NEGATIVE
PH UR STRIP: 5.5
PROT UR STRIP-MCNC: NEGATIVE
SP GR UR STRIP: 1.03

## 2021-11-09 PROCEDURE — 51701 INSERT BLADDER CATHETER: CPT

## 2021-11-09 PROCEDURE — 99214 OFFICE O/P EST MOD 30 MIN: CPT | Mod: 25

## 2021-11-09 NOTE — HISTORY OF PRESENT ILLNESS
[FreeTextEntry1] : Pt presents to office with c/o dysuria and urgency since 11/6, one episode of gross hematuria overnight on 11/6.  She was unable to be evaluated anywhere on 11/7 so she started taking abx that she had at home (augmentin).  She took 2 doses of this on Sunday and called our office on 11/8 for a script for a culture to be sent to [facility].  I tried to call the lab to see if there is a culture pending but there was no answer and I did not see an order in allscripts.  Chart reviewed.  She has + culture on:\par  10/15/21 with 50-100K citrobacter koseri and\par  5/25/21: >100K citrobacter koseri\par She has been on methenamine in the past (2018) which was working well for her.  She does note that she gets UTI symptoms after a flare up of her IBS.  She is using vaginal estrogen BIW as instructed.

## 2021-11-09 NOTE — PHYSICAL EXAM
[No Acute Distress] : in no acute distress [Well developed] : well developed [Well Nourished] : ~L well nourished [Good Hygeine] : demonstrates good hygeine [Oriented x3] : oriented to person, place, and time [Normal Memory] : ~T memory was ~L unimpaired [Normal Mood/Affect] : mood and affect are normal [Soft, Nontender] : the abdomen was soft and nontender [None] : no CVA tenderness [Warm and Dry] : was warm and dry to touch [Normal Gait] : gait was normal [Normal] : was normal [Normal Appearance] : general appearance was normal

## 2021-11-09 NOTE — DISCUSSION/SUMMARY
[FreeTextEntry1] : Unsure if there is a culture pending from yesterday.  Will send CS today and will treat based on results as she has a hx cdiff and is hesitant to take multiple antibiotics.  We discussed in detail that she should not start any antibiotics prior to obtaining culture.  She can use AZO PRN.  Continue vaginal estrogen and if this culture positive, will consider restarting methenamine ppx.  Instructed to call with any questions or concerns and she verbalizes understanding. \par \par I spent 35 minutes with patient and all questions answered.

## 2021-11-09 NOTE — PROCEDURE
[Straight Catheterization] : insertion of a straight catheter [Urinary Tract Infection] : a urinary tract infection [Patient] : the patient [___ Fr Straight Tip] : a [unfilled] in [place] straight tip catheter [None] : there were no complications with the catheter insertion [Clear] : clear [Culture] : culture [No Complications] : no complications [Tolerated Well] : the patient tolerated the procedure well [Post procedure instructions and information given] : Post procedure instructions and information were given and reviewed with patient. [0] : 0 [FreeTextEntry9] : PVR 20ml

## 2021-11-11 LAB — BACTERIA UR CULT: NORMAL

## 2021-11-12 ENCOUNTER — NON-APPOINTMENT (OUTPATIENT)
Age: 71
End: 2021-11-12

## 2022-01-14 ENCOUNTER — RESULT CHARGE (OUTPATIENT)
Age: 72
End: 2022-01-14

## 2022-01-14 ENCOUNTER — APPOINTMENT (OUTPATIENT)
Dept: UROGYNECOLOGY | Facility: CLINIC | Age: 72
End: 2022-01-14
Payer: MEDICARE

## 2022-01-14 LAB
BILIRUB UR QL STRIP: NEGATIVE
CLARITY UR: CLEAR
COLLECTION METHOD: NORMAL
GLUCOSE UR-MCNC: NEGATIVE
HCG UR QL: 0.2 EU/DL
HGB UR QL STRIP.AUTO: NORMAL
KETONES UR-MCNC: NEGATIVE
LEUKOCYTE ESTERASE UR QL STRIP: NORMAL
NITRITE UR QL STRIP: NEGATIVE
PH UR STRIP: 6.5
PROT UR STRIP-MCNC: NEGATIVE
SP GR UR STRIP: 1.02

## 2022-01-14 PROCEDURE — 99213 OFFICE O/P EST LOW 20 MIN: CPT | Mod: 25

## 2022-01-14 PROCEDURE — 51701 INSERT BLADDER CATHETER: CPT

## 2022-01-14 NOTE — HISTORY OF PRESENT ILLNESS
[FreeTextEntry1] : Pt presents to office with c/o dysuria and mild pelvic pressure.  Hx recurrent UTI.  She is using vaginal estrogen BIW and had been on methenamine ppx in the past.  States she was called this am by PST (scheduled to have knee surgery at the end of January) and was told she has UTI.  Per pt, was asked to give another culture but she decided she would call to be evaluated here with cath specimen.  Notes dysuria started this am.  Denies any blood in the urine, back pain, fever or chills.  Culture in office on 11/9/21 was negative but culture from lab on 12/17 was >100K ecoli and she was treated with macrobid.  Culture 10/15/21: 50-100K citrobacter koseri.  Last cysto 2018.

## 2022-01-14 NOTE — DISCUSSION/SUMMARY
[FreeTextEntry1] : In office dip indicates likely UTI.  WIll send UA CS and treat empirically with keflex as pt states she is uncomfortable.  She is requesting results be faxed to PST at [phone] Attn: [facility] OR date 1/25/22 once received.  Continue vaginal estrogen and restart methenamine ppx.  Advised to call if she has symptoms of UTI.  WIll consider repeat cysto.  Pt verbalizes understanding of plan.  Instructed to call with any questions or concerns and she verbalizes understanding.

## 2022-01-14 NOTE — PROCEDURE
[Straight Catheterization] : insertion of a straight catheter [Urinary Tract Infection] : a urinary tract infection [Patient] : the patient [___ Fr Straight Tip] : a [unfilled] in [place] straight tip catheter [None] : there were no complications with the catheter insertion [Cloudy] : cloudy [Culture] : culture [Urinalysis] : urinalysis [No Complications] : no complications [Tolerated Well] : the patient tolerated the procedure well [Post procedure instructions and information given] : Post procedure instructions and information were given and reviewed with patient. [0] : 0

## 2022-01-18 ENCOUNTER — NON-APPOINTMENT (OUTPATIENT)
Age: 72
End: 2022-01-18

## 2022-01-18 LAB
APPEARANCE: ABNORMAL
BACTERIA: ABNORMAL
BILIRUBIN URINE: NEGATIVE
BLOOD URINE: ABNORMAL
COLOR: YELLOW
GLUCOSE QUALITATIVE U: NEGATIVE
HYALINE CASTS: 10 /LPF
KETONES URINE: NEGATIVE
LEUKOCYTE ESTERASE URINE: ABNORMAL
MICROSCOPIC-UA: NORMAL
NITRITE URINE: POSITIVE
PH URINE: 6.5
PROTEIN URINE: NORMAL
RED BLOOD CELLS URINE: 28 /HPF
SPECIFIC GRAVITY URINE: 1.02
SQUAMOUS EPITHELIAL CELLS: 1 /HPF
UROBILINOGEN URINE: NORMAL
WHITE BLOOD CELLS URINE: >720 /HPF

## 2022-02-02 ENCOUNTER — NON-APPOINTMENT (OUTPATIENT)
Age: 72
End: 2022-02-02

## 2022-02-04 ENCOUNTER — NON-APPOINTMENT (OUTPATIENT)
Age: 72
End: 2022-02-04

## 2022-02-07 RX ORDER — NITROFURANTOIN (MONOHYDRATE/MACROCRYSTALS) 25; 75 MG/1; MG/1
100 CAPSULE ORAL
Qty: 10 | Refills: 0 | Status: DISCONTINUED | COMMUNITY
Start: 2021-12-20 | End: 2022-02-07

## 2022-03-10 ENCOUNTER — RESULT CHARGE (OUTPATIENT)
Age: 72
End: 2022-03-10

## 2022-03-10 ENCOUNTER — APPOINTMENT (OUTPATIENT)
Dept: UROGYNECOLOGY | Facility: CLINIC | Age: 72
End: 2022-03-10
Payer: MEDICARE

## 2022-03-10 PROCEDURE — 81003 URINALYSIS AUTO W/O SCOPE: CPT | Mod: QW

## 2022-03-10 PROCEDURE — 99213 OFFICE O/P EST LOW 20 MIN: CPT

## 2022-03-14 LAB
APPEARANCE: CLEAR
BACTERIA UR CULT: NORMAL
BACTERIA: NEGATIVE
BILIRUB UR QL STRIP: NEGATIVE
BILIRUBIN URINE: NEGATIVE
BLOOD URINE: NEGATIVE
CLARITY UR: CLEAR
COLLECTION METHOD: NORMAL
COLOR: YELLOW
GLUCOSE QUALITATIVE U: NEGATIVE
GLUCOSE UR-MCNC: NEGATIVE
HCG UR QL: 0.2 EU/DL
HGB UR QL STRIP.AUTO: NEGATIVE
HYALINE CASTS: 1 /LPF
KETONES UR-MCNC: NEGATIVE
KETONES URINE: NEGATIVE
LEUKOCYTE ESTERASE UR QL STRIP: NEGATIVE
LEUKOCYTE ESTERASE URINE: NEGATIVE
MICROSCOPIC-UA: NORMAL
NITRITE UR QL STRIP: NEGATIVE
NITRITE URINE: NEGATIVE
PH UR STRIP: 5.5
PH URINE: 6
PROT UR STRIP-MCNC: NEGATIVE
PROTEIN URINE: NEGATIVE
RED BLOOD CELLS URINE: 1 /HPF
SP GR UR STRIP: 1.01
SPECIFIC GRAVITY URINE: 1.01
SQUAMOUS EPITHELIAL CELLS: 1 /HPF
UROBILINOGEN URINE: NORMAL
WHITE BLOOD CELLS URINE: 4 /HPF

## 2022-03-16 NOTE — HISTORY OF PRESENT ILLNESS
[Urinary Frequency] : no [FreeTextEntry1] : Flaca arrived today c/o dysuria that began one day ago , pt has a long history or recurrent UTI. she continues to use Estradiol MWF.  pt states last uti she was rx macrobid and had an allergic reaction, restarted on Cephalexin 500 mg twice a day. pt also believes she had a reaction to Methenamine Hippurate as well. \par s/p CT Urogram @ [facility] March 4,2022 unremarkable.\par cystoscopy April 2018

## 2022-04-05 ENCOUNTER — APPOINTMENT (OUTPATIENT)
Dept: UROGYNECOLOGY | Facility: CLINIC | Age: 72
End: 2022-04-05
Payer: MEDICARE

## 2022-04-05 ENCOUNTER — RESULT CHARGE (OUTPATIENT)
Age: 72
End: 2022-04-05

## 2022-04-05 LAB
BILIRUB UR QL STRIP: NORMAL
CLARITY UR: CLEAR
COLLECTION METHOD: NORMAL
GLUCOSE UR-MCNC: NORMAL
HCG UR QL: 0.2 EU/DL
HGB UR QL STRIP.AUTO: NORMAL
KETONES UR-MCNC: NORMAL
LEUKOCYTE ESTERASE UR QL STRIP: NORMAL
NITRITE UR QL STRIP: NORMAL
PH UR STRIP: 6
PROT UR STRIP-MCNC: NORMAL
SP GR UR STRIP: 1.03

## 2022-04-05 PROCEDURE — 52000 CYSTOURETHROSCOPY: CPT

## 2022-04-05 PROCEDURE — 99213 OFFICE O/P EST LOW 20 MIN: CPT | Mod: 25

## 2022-04-05 NOTE — PHYSICAL EXAM
[No Acute Distress] : in no acute distress [Well developed] : well developed [Well Nourished] : ~L well nourished [Good Hygeine] : demonstrates good hygeine [Oriented x3] : oriented to person, place, and time [Normal Memory] : ~T memory was ~L unimpaired [Normal Mood/Affect] : mood and affect are normal [Warm and Dry] : was warm and dry to touch [Normal Gait] : gait was normal [Labia Majora] : were normal [Labia Minora] : were normal [Normal] : was normal [Normal Appearance] : general appearance was normal [Atrophy] : atrophy [No Bleeding] : there was no active vaginal bleeding [Post Void Residual ____ml] : post void residual was [unfilled] ml [Cough] : no cough [No Edema] : ~T edema was present [Tenderness] : ~T no ~M abdominal tenderness observed [Distended] : not distended [H/Smegaly] : no hepatosplenomegaly [Hernia] : no hernia observed

## 2022-04-05 NOTE — DISCUSSION/SUMMARY
[FreeTextEntry1] : Hyperemic bladder mucosa on cysto consistent with recurrent UTIs. We discussed options. She is still using vag estrogen and I sent an Rx to her pharmacy for refills. We discussed D mannose and cranberry. We discussed using pyridium as needed. Rx for pyridium sent to pharmacy. Will send urine today.

## 2022-04-05 NOTE — HISTORY OF PRESENT ILLNESS
[FreeTextEntry1] : Flaca has a history of UTIs. This past 1.2 years she has been feeling symptoms of UTI frequently and has had several positive cultures. She had a reaction to methenamine. She is using vaginal estrogen. She is here for cysto. Has not been on daily suppression. She has history of c diff and so she is worried about the amount of antibiotics she takes.  Sometimes will have burning and then she waits a day or 2 and it resolves.

## 2022-04-14 LAB
APPEARANCE: ABNORMAL
BACTERIA: ABNORMAL
BILIRUBIN URINE: NEGATIVE
BLOOD URINE: ABNORMAL
COLOR: YELLOW
GLUCOSE QUALITATIVE U: NEGATIVE
HYALINE CASTS: 4 /LPF
KETONES URINE: NEGATIVE
LEUKOCYTE ESTERASE URINE: ABNORMAL
MICROSCOPIC-UA: NORMAL
NITRITE URINE: NEGATIVE
PH URINE: 6
PROTEIN URINE: ABNORMAL
RED BLOOD CELLS URINE: 8 /HPF
SPECIFIC GRAVITY URINE: 1.01
SQUAMOUS EPITHELIAL CELLS: 0 /HPF
URINE COMMENTS: NORMAL
UROBILINOGEN URINE: NORMAL
WHITE BLOOD CELLS URINE: >720 /HPF

## 2022-04-28 ENCOUNTER — APPOINTMENT (OUTPATIENT)
Dept: RHEUMATOLOGY | Facility: CLINIC | Age: 72
End: 2022-04-28

## 2022-05-05 ENCOUNTER — APPOINTMENT (OUTPATIENT)
Dept: RHEUMATOLOGY | Facility: CLINIC | Age: 72
End: 2022-05-05

## 2022-05-12 ENCOUNTER — APPOINTMENT (OUTPATIENT)
Dept: RHEUMATOLOGY | Facility: CLINIC | Age: 72
End: 2022-05-12

## 2022-05-18 ENCOUNTER — APPOINTMENT (OUTPATIENT)
Dept: UROGYNECOLOGY | Facility: CLINIC | Age: 72
End: 2022-05-18

## 2022-05-19 ENCOUNTER — APPOINTMENT (OUTPATIENT)
Dept: UROGYNECOLOGY | Facility: CLINIC | Age: 72
End: 2022-05-19
Payer: MEDICARE

## 2022-05-19 ENCOUNTER — RESULT CHARGE (OUTPATIENT)
Age: 72
End: 2022-05-19

## 2022-05-19 PROCEDURE — 99213 OFFICE O/P EST LOW 20 MIN: CPT | Mod: 25

## 2022-05-19 PROCEDURE — 51701 INSERT BLADDER CATHETER: CPT

## 2022-05-19 NOTE — HISTORY OF PRESENT ILLNESS
[FreeTextEntry1] : Flaca has recurrent UTIs. At her last visit I sent Rx for pyridium, d mannose and recommended cranberry. She is already on vagina estrogen. She had a reaction to methenamine. She had c diff and does not want to take low dose antibiotic prophylaxis. 2 weeks ago felt burning and went to [facility] who did culture and was given bactrim that she has not taken. The burning has been intermittent. She is taking d mannose and cranberry tablets. She has a friend who had some special lab core testing and is on some other meds for this. She does not feel her friend is getting any better.

## 2022-05-19 NOTE — DISCUSSION/SUMMARY
[FreeTextEntry1] : Flaca has recurrent UTIs. She is very unhappy with how difficult it is to get in touch with our office or to get urgent appointments. Despite documented multiple phone calls she says she never gets a call back. She is also unhappy with how long it takes for her to drive to our office. She states that when she does get a call back she is told she can't see us for many weeks, despite her being aware that she can see an NP, PA or RN for urine check, when daily appointments are always available. We discussed that I have exhausted all treatment options I am aware of and that I do not have anything else I can offer her. I suggested she get a second opinion and suggest Dr. Nj or Dr. Multani nearer to her home. She wants to be called with the results of her urine and I explained again that our office practice does not call if negative and she repeated that she must be called whether the results are positive or negative. I sent a cath urine specimen today. I believe her clean catch urine from her primary was contaminated.

## 2022-05-19 NOTE — PHYSICAL EXAM
[Chaperone Present] : A chaperone was present in the examining room during all aspects of the physical examination [Normal] : was normal

## 2022-05-20 LAB
APPEARANCE: CLEAR
BACTERIA: NEGATIVE
BILIRUBIN URINE: NEGATIVE
BLOOD URINE: NEGATIVE
COLOR: COLORLESS
GLUCOSE QUALITATIVE U: NEGATIVE
HYALINE CASTS: 0 /LPF
KETONES URINE: NEGATIVE
LEUKOCYTE ESTERASE URINE: NEGATIVE
MICROSCOPIC-UA: NORMAL
NITRITE URINE: NEGATIVE
PH URINE: 6.5
PROTEIN URINE: NEGATIVE
RED BLOOD CELLS URINE: 0 /HPF
SPECIFIC GRAVITY URINE: 1.01
SQUAMOUS EPITHELIAL CELLS: 0 /HPF
UROBILINOGEN URINE: NORMAL
WHITE BLOOD CELLS URINE: 2 /HPF

## 2022-05-23 LAB — BACTERIA UR CULT: NORMAL

## 2022-06-03 ENCOUNTER — APPOINTMENT (OUTPATIENT)
Dept: UROGYNECOLOGY | Facility: CLINIC | Age: 72
End: 2022-06-03
Payer: MEDICARE

## 2022-06-03 VITALS
HEIGHT: 63 IN | DIASTOLIC BLOOD PRESSURE: 80 MMHG | WEIGHT: 185 LBS | BODY MASS INDEX: 32.78 KG/M2 | SYSTOLIC BLOOD PRESSURE: 142 MMHG

## 2022-06-03 PROCEDURE — 99215 OFFICE O/P EST HI 40 MIN: CPT

## 2022-06-03 NOTE — DISCUSSION/SUMMARY
[FreeTextEntry1] : The patient was counseled regarding the pathophysiology of the frequent UTI, atrophic vaginitis, nocturia and constipation. A total of approximately 45-50 minutes was spent on this visit, greater than 50% of which was spent on counseling. She was also counseled regarding the risks, benefits, indications, and alternatives of further evaluations studies, as well as various management options. After a detailed discussion, following management plan was outlined:\par 1. UA, culture ordered from cath specimen today. If it returns positive, will treat the infection as indicated. \par 2.  Discussed UTI prophylaxis strategies. She reports getting a UTI on estrogen prophylaxis but she is not using the cream as prescribed. Advised using 1 gram at bedtime 2-3 times per week. \par 3. Counseled regarding possible need for low dose Keflex for UTI prophylaxis if an adequate trial of vaginal estrogen doesn't work. She seems open to it after counselling. \par 4. Provided her scripts for urine culture, sterile cups, lab info etc and advised her to get a culture done and call our office if she develops recurrent symptoms\par 5. Discussed avoidance of constipation and straining for bowel movements. Discussed improving stool consistency/regularity and avoidance of diarrhea. Written and verbal instructions provided. \par 6.  Discussed Avoidance of bladder and vulvar irritants, perineal washing etc\par 7. Discussed strategies to improve nocturia. She may benefit from testing for sleep apnea. Will discuss on f/u visit\par 8.  F/u in 2 months\par Patient is comfortable with this plan and verbalized understanding. \par

## 2022-06-03 NOTE — ASSESSMENT
[FreeTextEntry1] : Patient is a 72 years old with symptoms of intermittent dysuria, hx of recurrent UTIs, atrophic vaginitis and IBS. She has a normal PVR and normal vaginal support. She is not using the vaginal estrogen as advised. She is reluctant to consider low dose antibiotic suppression due to hx of C-diff while getting treated for MRSA.

## 2022-06-03 NOTE — PHYSICAL EXAM
[Chaperone Present] : A chaperone was present in the examining room during all aspects of the physical examination [FreeTextEntry1] : General: Not in acute distress, alert and oriented x3.\par Neck: Supple. No lymphadenopathy. \par Abdomen: Soft, nontender, and nondistended. No obvious hepatosplenomegaly. No obvious hernias.\par Pelvic Exam: Normal external female genitalia.  Urethra is well supported without prolapse, exudates, or lesions. Cough stress test is negative. Post void residual was checked with I/O cath and was 30 cc of clear urine. Pale and  atrophic-appearing vaginal epithelium with petechial hemorrhages. No vaginal blood or discharge. All vaginal compartments are well supported\par \par

## 2022-06-03 NOTE — HISTORY OF PRESENT ILLNESS
[FreeTextEntry1] : Patient is a 72 years old who presents today with concerns of intermittent dysuria. She has been following up with Dr. Farnsworth and NP Joelle Subramanian but wants to establish care closer to her home.\par Her main concern is frequent symptoms of burning with urination which sometimes lasts for few hours and sometimes longer. She has positive urine cultures in the past (most recent positive culture in EMR May 2022- E.coli)\par but she had a culture done at PCPs office last week which grew bacteria other than E.coli. She is hesitant to use antibiotic due to hx of C-diff. She developed C-diff colitis while receiving IV antibiotics for MRSA during  a postop hospitalization. She reports allergy to Macrobid. She is applying  a pea size amount of estrogen to the urethra in the morning three times a week. She has irritable bowel syndrome and notices a relationship between her flares and UTI. She is consuming D-Mannose, and cranberry pills with Vitamic C

## 2022-06-04 LAB
APPEARANCE: CLEAR
BACTERIA: NEGATIVE
BILIRUBIN URINE: NEGATIVE
BLOOD URINE: NEGATIVE
COLOR: NORMAL
GLUCOSE QUALITATIVE U: NEGATIVE
HYALINE CASTS: 0 /LPF
KETONES URINE: NEGATIVE
LEUKOCYTE ESTERASE URINE: NEGATIVE
MICROSCOPIC-UA: NORMAL
NITRITE URINE: NEGATIVE
PH URINE: 6
PROTEIN URINE: NEGATIVE
RED BLOOD CELLS URINE: 0 /HPF
SPECIFIC GRAVITY URINE: 1.01
SQUAMOUS EPITHELIAL CELLS: 1 /HPF
UROBILINOGEN URINE: NORMAL
WHITE BLOOD CELLS URINE: 4 /HPF

## 2022-06-06 LAB — BACTERIA UR CULT: NORMAL

## 2022-07-07 ENCOUNTER — APPOINTMENT (OUTPATIENT)
Dept: UROGYNECOLOGY | Facility: CLINIC | Age: 72
End: 2022-07-07

## 2022-07-07 VITALS
WEIGHT: 185 LBS | HEIGHT: 63 IN | SYSTOLIC BLOOD PRESSURE: 124 MMHG | BODY MASS INDEX: 32.78 KG/M2 | DIASTOLIC BLOOD PRESSURE: 80 MMHG

## 2022-07-07 PROCEDURE — 99213 OFFICE O/P EST LOW 20 MIN: CPT

## 2022-07-08 ENCOUNTER — APPOINTMENT (OUTPATIENT)
Dept: ULTRASOUND IMAGING | Facility: CLINIC | Age: 72
End: 2022-07-08

## 2022-07-08 PROCEDURE — 76856 US EXAM PELVIC COMPLETE: CPT

## 2022-07-08 PROCEDURE — 76830 TRANSVAGINAL US NON-OB: CPT

## 2022-07-13 ENCOUNTER — NON-APPOINTMENT (OUTPATIENT)
Age: 72
End: 2022-07-13

## 2022-07-14 ENCOUNTER — NON-APPOINTMENT (OUTPATIENT)
Age: 72
End: 2022-07-14

## 2022-08-01 ENCOUNTER — APPOINTMENT (OUTPATIENT)
Dept: UROGYNECOLOGY | Facility: CLINIC | Age: 72
End: 2022-08-01

## 2022-08-01 DIAGNOSIS — N95.0 POSTMENOPAUSAL BLEEDING: ICD-10-CM

## 2022-08-01 DIAGNOSIS — R31.9 HEMATURIA, UNSPECIFIED: ICD-10-CM

## 2022-08-01 PROCEDURE — 99212 OFFICE O/P EST SF 10 MIN: CPT | Mod: 25

## 2022-08-01 PROCEDURE — 58100 BIOPSY OF UTERUS LINING: CPT

## 2022-08-01 NOTE — PROCEDURE
[Endometrial Biopsy] : an Endometrial Biopsy [Patient] : the patient [Allergies Reviewed] : Allergies reviewed [None] : none [Betadine] : betadine [FreeTextEntry1] : Uterus sounded to 6 cm. Cervix was stenotic, was dilated. Pipelle was introduced and specimen was obtained. 2 Passes were done. Scant tissue and blood obtained. Uterine line feels thin

## 2022-08-01 NOTE — PHYSICAL EXAM
[Chaperone Present] : A chaperone was present in the examining room during all aspects of the physical examination [FreeTextEntry1] : General: Not in acute distress, alert and oriented x3.\par Abdomen: Soft, nontender, and nondistended. \par Pelvic Exam: Normal external female genitalia. Cervix parous without abnormal lesions, some descensus. Bimanual exam reveals a small uterus in normal positioning. EMB was performed without difficulty

## 2022-08-01 NOTE — DISCUSSION/SUMMARY
[FreeTextEntry1] : EMB performed\par Postprocedure precautions reviewed. Discussed analgesic intake for uterine cramping\par Heavy bleeding/ pain / fever precautions reviewed\par F/u in 2-3 weeks\par

## 2022-08-01 NOTE — HISTORY OF PRESENT ILLNESS
[FreeTextEntry1] : Patient presents today for EMB due to finding of thickened endometrium on recent pelvic ultrasound. She noted some spotting/ vaginal bleeding few weeks ago but was also diagnosed with UTI around the same time\par Pelvic ultrasound dated 7/8/22 showed uterus 6 cm, ET 6-7 mm with a 13x 6x 17 mm polyp with negative blood flow. S/p bilateral oophorectomy.

## 2022-08-02 NOTE — DISCUSSION/SUMMARY
[Visit Time ___ Minutes] : [unfilled] minutes [Face to Face Time___ Minutes] : with [unfilled] minutes in face to face consultation. [FreeTextEntry1] : Prescribed Keflex 500 mg po bid x 7 days, then 250 mg po daily x 3 months \par Probiotics\par TVUS to screen for endometrial thickening \par F/u in August as scheduuled\par

## 2022-08-02 NOTE — HISTORY OF PRESENT ILLNESS
[FreeTextEntry1] : Patient is a 72 years old with symptoms of intermittent dysuria, hx of recurrent UTIs, atrophic vaginitis and IBS. She has a normal PVR and normal vaginal support. She is not using the vaginal estrogen as advised. She is reluctant to consider low dose antibiotic suppression due to hx of C-diff while getting treated for MRSA. Patient was last seen on 6/3/22. Her urine culture returned negative from that visit. She presents today reporting a brief episode of burning with urination and noted blood in her urine. She went to lab on 7/1/22  and dropped a urine specimen. Her burning stopped after using a tablet of Pyridium. She is using vaginal estrogen cream as prescribed\par Her urine culture from 7/1/22 grew 100,000 cfu of E.coli, resistant to Bactrim .

## 2022-08-02 NOTE — ASSESSMENT
[FreeTextEntry1] : Patient with recurrent UTIs despite normal PVR, no prolapse and vaginal estrogen use. She had normal CT Urogram and cystoscopy in March 2022.

## 2022-08-05 LAB — CORE LAB BIOPSY: NORMAL

## 2022-08-08 ENCOUNTER — APPOINTMENT (OUTPATIENT)
Dept: UROGYNECOLOGY | Facility: CLINIC | Age: 72
End: 2022-08-08

## 2022-08-19 ENCOUNTER — APPOINTMENT (OUTPATIENT)
Dept: UROGYNECOLOGY | Facility: CLINIC | Age: 72
End: 2022-08-19

## 2022-08-19 VITALS
HEIGHT: 63 IN | DIASTOLIC BLOOD PRESSURE: 80 MMHG | BODY MASS INDEX: 32.78 KG/M2 | WEIGHT: 185 LBS | SYSTOLIC BLOOD PRESSURE: 136 MMHG

## 2022-08-19 DIAGNOSIS — R93.89 ABNORMAL FINDINGS ON DIAGNOSTIC IMAGING OF OTHER SPECIFIED BODY STRUCTURES: ICD-10-CM

## 2022-08-19 PROCEDURE — 99213 OFFICE O/P EST LOW 20 MIN: CPT

## 2022-08-19 NOTE — HISTORY OF PRESENT ILLNESS
[FreeTextEntry1] : Patient presents today to discuss results of EMB which was done on 6/3/22 due to finding of thickened endometrium, possible endometrial polyp on recent pelvic ultrasound. She noted some spotting/ vaginal bleeding few weeks ago but was also diagnosed with UTI around the same time\par Pelvic ultrasound dated 7/8/22 showed uterus 6 cm, ET 6-7 mm with a 13x 6x 17 mm polyp with negative blood flow. S/p bilateral oophorectomy. \par Patient's EMB path returned as: - Scant strips of atrophic endometrium,  - Benign squamous epithelium and endocervical tissue.\par Patient endorses hx of endometrial ablation 20 years ago and D&Cs in the past\par She denies ongoing vaginal spotting/ bleeding\par

## 2022-08-19 NOTE — ASSESSMENT
[FreeTextEntry1] : Patient with thickened endometrium of 6 mm with suspected polyp on TVUS vs postprocedural changes of ablation etc. Biopsy is benign but didn't report presence of endometrial polyp in the specimen

## 2022-08-19 NOTE — DISCUSSION/SUMMARY
[Visit Time ___ Minutes] : [unfilled] minutes [Face to Face Time___ Minutes] : with [unfilled] minutes in face to face consultation. [FreeTextEntry1] : Reviewed the EMB findings with the patient.\par Recommended hysteroscopy, possible polypectomy , D&c. I advised her to follow up with her gynecologist. She declines and requests to undergo this procedure with me. She also inquires about directly proceeding with hysterectomy. I informed her that there is no indication to do hysterectomy at this point unless there is abnormal pathology. She understands. \par She is overall healthy. Will submit OR booking sheet.

## 2022-09-19 ENCOUNTER — APPOINTMENT (OUTPATIENT)
Dept: UROGYNECOLOGY | Facility: CLINIC | Age: 72
End: 2022-09-19

## 2022-09-19 VITALS
BODY MASS INDEX: 32.78 KG/M2 | WEIGHT: 185 LBS | HEIGHT: 63 IN | DIASTOLIC BLOOD PRESSURE: 70 MMHG | SYSTOLIC BLOOD PRESSURE: 126 MMHG

## 2022-09-19 DIAGNOSIS — N84.0 POLYP OF CORPUS UTERI: ICD-10-CM

## 2022-09-19 PROCEDURE — 99213 OFFICE O/P EST LOW 20 MIN: CPT

## 2022-09-19 RX ORDER — PREDNISONE 5 MG/1
5 TABLET ORAL
Qty: 20 | Refills: 0 | Status: DISCONTINUED | COMMUNITY
Start: 2019-12-16 | End: 2022-09-19

## 2022-09-19 NOTE — HISTORY OF PRESENT ILLNESS
[FreeTextEntry1] : Patient is a 72-year-old who has been following for recurrent urinary tract infections.  She was noted to have thickened endometrium of 6 mm with suspected polyp on TVUS vs postprocedural changes of ablation etc. she had office endometrial biopsy which was read as atrophic endometrium but the path report didn't mention presence of endometrial polyp in the specimen.  She is a scheduled for  hysteroscopy, possible polypectomy in October.  Presents today for preop discussion.  She has no new concerns today. She denies postmenopausal bleeding.  She is a scheduled for her preop testing next week\par Endometrium:\par      - Scant strips of atrophic endometrium\par      - Benign squamous epithelium and endocervical tissue\par \par She is using Keflex 500 mg milligram for UTI prophylaxis.  She is also using vaginal estrogen cream which I asked her to discontinue till we get the report of the D&C and hystoroscopy.  She denies dysuria

## 2022-09-19 NOTE — DISCUSSION/SUMMARY
[Visit Time ___ Minutes] : [unfilled] minutes [Face to Face Time___ Minutes] : with [unfilled] minutes in face to face consultation. [FreeTextEntry1] : Patient was counseled regarding the risk benefits and alternatives of planned procedure.\par Specifically we did discuss risk of infection, heavy bleeding, uterine perforation, need for emergency laparotomy, fluid overload, blood transfusion, inability to perform the procedure and need for future surgery etc. I also discussed with her that I will be using a [employer] hysteroscopy system and\par  the company representative might come in the operating room after she is prepped and draped.  She is comfortable with this plan all questions were answered to her satisfaction.

## 2022-09-21 LAB
APPEARANCE: CLEAR
BACTERIA UR CULT: NORMAL
BACTERIA: NEGATIVE
BILIRUBIN URINE: NEGATIVE
BLOOD URINE: NEGATIVE
COLOR: YELLOW
GLUCOSE QUALITATIVE U: NEGATIVE
HYALINE CASTS: 1 /LPF
KETONES URINE: NEGATIVE
LEUKOCYTE ESTERASE URINE: ABNORMAL
MICROSCOPIC-UA: NORMAL
NITRITE URINE: NEGATIVE
PH URINE: 6
PROTEIN URINE: NORMAL
RED BLOOD CELLS URINE: 5 /HPF
SPECIFIC GRAVITY URINE: 1.02
SQUAMOUS EPITHELIAL CELLS: 5 /HPF
UROBILINOGEN URINE: NORMAL
WHITE BLOOD CELLS URINE: 25 /HPF

## 2022-09-23 ENCOUNTER — RESULT REVIEW (OUTPATIENT)
Age: 72
End: 2022-09-23

## 2022-10-05 ENCOUNTER — APPOINTMENT (OUTPATIENT)
Dept: UROGYNECOLOGY | Facility: CLINIC | Age: 72
End: 2022-10-05

## 2022-10-10 ENCOUNTER — APPOINTMENT (OUTPATIENT)
Dept: UROGYNECOLOGY | Facility: CLINIC | Age: 72
End: 2022-10-10

## 2022-11-02 ENCOUNTER — NON-APPOINTMENT (OUTPATIENT)
Age: 72
End: 2022-11-02

## 2022-11-08 ENCOUNTER — APPOINTMENT (OUTPATIENT)
Dept: UROGYNECOLOGY | Facility: CLINIC | Age: 72
End: 2022-11-08

## 2022-11-08 ENCOUNTER — RESULT REVIEW (OUTPATIENT)
Age: 72
End: 2022-11-08

## 2022-11-08 PROCEDURE — 58558 HYSTEROSCOPY BIOPSY: CPT

## 2022-11-21 ENCOUNTER — APPOINTMENT (OUTPATIENT)
Dept: UROGYNECOLOGY | Facility: CLINIC | Age: 72
End: 2022-11-21

## 2022-11-21 VITALS
BODY MASS INDEX: 32.78 KG/M2 | SYSTOLIC BLOOD PRESSURE: 122 MMHG | HEIGHT: 63 IN | DIASTOLIC BLOOD PRESSURE: 70 MMHG | WEIGHT: 185 LBS

## 2022-11-21 PROCEDURE — 99213 OFFICE O/P EST LOW 20 MIN: CPT

## 2022-11-21 NOTE — SUBJECTIVE
[FreeTextEntry1] : Patient is a 72-year-old s/p D&C/ hysteroscopy for thickened endometrium on 11/8/22. She presents today for a followup. Denies vaginal spotting/ bleeding. No concerns for UTI.

## 2022-11-21 NOTE — ASSESSMENT
[FreeTextEntry1] : Patient is making satisfactory postop recovery. Benign path. \par Discussed strategies for UTI prevention.  She will resume vaginal estrogen cream. Also discussed use of Hiprex instead of antibiotic prophylaxis. Prescription for Hiprex sent\par Script for UA/ culture ordered\par F/u in 3 months

## 2022-11-22 LAB
APPEARANCE: ABNORMAL
BACTERIA: ABNORMAL
BILIRUBIN URINE: NEGATIVE
BLOOD URINE: ABNORMAL
COLOR: ABNORMAL
GLUCOSE QUALITATIVE U: NEGATIVE
HYALINE CASTS: 0 /LPF
KETONES URINE: NEGATIVE
LEUKOCYTE ESTERASE URINE: ABNORMAL
MICROSCOPIC-UA: NORMAL
NITRITE URINE: POSITIVE
PH URINE: 6
PROTEIN URINE: ABNORMAL
RED BLOOD CELLS URINE: 45 /HPF
SPECIFIC GRAVITY URINE: 1.02
SQUAMOUS EPITHELIAL CELLS: 3 /HPF
UROBILINOGEN URINE: NORMAL
WHITE BLOOD CELLS URINE: >720 /HPF

## 2022-12-01 ENCOUNTER — MED ADMIN CHARGE (OUTPATIENT)
Age: 72
End: 2022-12-01

## 2022-12-01 ENCOUNTER — APPOINTMENT (OUTPATIENT)
Dept: RHEUMATOLOGY | Facility: CLINIC | Age: 72
End: 2022-12-01

## 2022-12-01 VITALS
TEMPERATURE: 97.9 F | WEIGHT: 185 LBS | DIASTOLIC BLOOD PRESSURE: 79 MMHG | HEART RATE: 87 BPM | OXYGEN SATURATION: 97 % | SYSTOLIC BLOOD PRESSURE: 132 MMHG | BODY MASS INDEX: 32.77 KG/M2

## 2022-12-01 PROCEDURE — 99213 OFFICE O/P EST LOW 20 MIN: CPT | Mod: 25

## 2022-12-01 PROCEDURE — 20610 DRAIN/INJ JOINT/BURSA W/O US: CPT | Mod: 50

## 2022-12-02 RX ORDER — TRIAMCINOLONE ACETONIDE 80 MG/ML
80 INJECTION, SUSPENSION INTRA-ARTICULAR; INTRAMUSCULAR
Qty: 16 | Refills: 0 | Status: COMPLETED | OUTPATIENT
Start: 2022-12-02

## 2022-12-02 RX ADMIN — TRIAMCINOLONE ACETONIDE 0 MG/ML: 80 INJECTION, SUSPENSION INTRA-ARTICULAR; INTRAMUSCULAR at 00:00

## 2022-12-02 NOTE — HISTORY OF PRESENT ILLNESS
[FreeTextEntry1] : 72 year-old female with osteoarthritis and osteoporosis presents for pain in b/L outer hip.  \par \par Patient reports pain in right outer hip > left. \par Feels she needs CS injection to bursa.  \par \par Pain is 8-9/10 in intensity.  \par \par Had knee replacements  \par Requesting handicap parking form filled.\par \par

## 2022-12-02 NOTE — ASSESSMENT
[FreeTextEntry1] : 72  year-old female with polyarticular OA and osteoporosis here for followup.\par \par B/l Knee OA and hip OA.\par Had knee replacements\par Parking sticker form filled out.  \par \par b/L GTB\par B/l GTB injection today12/1/22\par \par \par She is aware to call if her symptoms worsen. \par \par f/u 6 months\par \par  \par

## 2022-12-02 NOTE — PROCEDURE
[FreeTextEntry1] : Procedure: b/L GTB\par The procedure risks was discussed with the patient.\par Indications: therapeutic purposes \par #1 site identified in the Right GTB . Verbal consent was obtained. \par Anesthesia was with ethyl chloride.\par The patient was prepped with betadine solution. \par A 25 gauge 1.5 inch needle was used.\par Injectables: Kenalogl 80 mg was injected into the site The Kenalog was mixed with 1mL of xylocaine 1%. \par The patient tolerated the procedure well..\par There were no complications. Handouts/patient instructions were given to patient . patient was instructed to call if redness at site, a decrease in range of motion or an increase in pain is noted after procedure. \par  \par #2  Identical procedure repeated for Left GTB.  \par

## 2023-01-31 ENCOUNTER — APPOINTMENT (OUTPATIENT)
Dept: UROGYNECOLOGY | Facility: CLINIC | Age: 73
End: 2023-01-31
Payer: MEDICARE

## 2023-01-31 VITALS
SYSTOLIC BLOOD PRESSURE: 128 MMHG | BODY MASS INDEX: 32.78 KG/M2 | HEIGHT: 63 IN | DIASTOLIC BLOOD PRESSURE: 70 MMHG | WEIGHT: 185 LBS

## 2023-01-31 PROCEDURE — 99213 OFFICE O/P EST LOW 20 MIN: CPT

## 2023-01-31 NOTE — HISTORY OF PRESENT ILLNESS
[FreeTextEntry1] :  Patient is a 72-year-old who presents today for follow-up regarding frequent urinary tract infections..\par She was last seen in November 2022 for post op visit following D&C/ hysteroscopy for thickened endometrium on 11/8/22.  She had no urinary tract infection at that time.  She was advised to resume use of vaginal estrogen and start HipRex.\par Her urine culture from 11/21/2022 returned positive for E. coli.  She was treated with Keflex.  She repeated the urine culture on 12/5/2022 and it was again positive for E. coli.  She was asymptomatic at that time and decided to pursue expectant management.  She repeated Urine culture on 12/29/2022 which was again positive for E. coli.  She was again treated with Keflex.  The bacteria was resistant to Bactrim and she reported allergy to nitrofurantoin. She states that she developed symptoms concerning fr UTI on 1/20/23, went to [facility] and hasn't got the results back. \par Patient didn't take Hiprex. She states that she had tried it in the past and got white plaques in her mouth. \par She is very upset due to difficulty getting the report of her most recent urine culture from the advised lab.  She states that she has called several times in the lab staff keeps telling her her that they have faxed the report to my office.

## 2023-01-31 NOTE — ASSESSMENT
[FreeTextEntry1] : Discussed options for prophylaxis including resuming low dose antibiotic prophylaxis .  She states that she has used Macrobid in the past without any problem except 1 time in the generic was prescribed, he experienced generalized rash however she endorses that she was discussed discharge from the hospital from a procedure around the same time and is unsure if it was a side effect of nitrofurantoin versus other medications that she was taking at that time.  She would like to use Macrobid for prophylaxis if she can tolerate it.  I have sent a prescription to her pharmacy.\par [facility] on the phone to get the report of her urine culture.  I was still not able to receive it through fax or email.  We will have the nurse call to see if he can get a verbal\par Follow-up in 4 months\par Approximately 40 minutes spent in today's encounter.  100% of the time was spent in face-to-face patient counseling:, Review of her medical record records and time spent to obtain her records.

## 2023-02-01 LAB
APPEARANCE: ABNORMAL
BACTERIA: NEGATIVE
BILIRUBIN URINE: NEGATIVE
BLOOD URINE: ABNORMAL
COLOR: YELLOW
GLUCOSE QUALITATIVE U: NEGATIVE
HYALINE CASTS: 0 /LPF
KETONES URINE: NEGATIVE
LEUKOCYTE ESTERASE URINE: ABNORMAL
MICROSCOPIC-UA: NORMAL
NITRITE URINE: NEGATIVE
PH URINE: 6
PROTEIN URINE: NORMAL
RED BLOOD CELLS URINE: 5 /HPF
SPECIFIC GRAVITY URINE: 1.02
SQUAMOUS EPITHELIAL CELLS: 1 /HPF
UROBILINOGEN URINE: NORMAL
WHITE BLOOD CELLS URINE: 270 /HPF

## 2023-03-06 RX ORDER — DICLOFENAC SODIUM 1% 10 MG/G
1 GEL TOPICAL
Qty: 500 | Refills: 6 | Status: ACTIVE | COMMUNITY
Start: 2021-02-05 | End: 1900-01-01

## 2023-03-07 ENCOUNTER — APPOINTMENT (OUTPATIENT)
Dept: RHEUMATOLOGY | Facility: CLINIC | Age: 73
End: 2023-03-07
Payer: MEDICARE

## 2023-03-07 VITALS
DIASTOLIC BLOOD PRESSURE: 68 MMHG | OXYGEN SATURATION: 95 % | BODY MASS INDEX: 32.78 KG/M2 | HEIGHT: 63 IN | WEIGHT: 185 LBS | TEMPERATURE: 97.2 F | HEART RATE: 75 BPM | SYSTOLIC BLOOD PRESSURE: 122 MMHG

## 2023-03-07 PROCEDURE — 20610 DRAIN/INJ JOINT/BURSA W/O US: CPT | Mod: 50

## 2023-03-07 RX ORDER — TRIAMCINOLONE ACETONIDE 80 MG/ML
80 INJECTION, SUSPENSION INTRA-ARTICULAR; INTRAMUSCULAR
Qty: 16 | Refills: 0 | Status: COMPLETED | OUTPATIENT
Start: 2023-03-07

## 2023-03-07 RX ADMIN — TRIAMCINOLONE ACETONIDE 0 MG/ML: 80 INJECTION, SUSPENSION INTRA-ARTICULAR; INTRAMUSCULAR at 00:00

## 2023-03-07 NOTE — PROCEDURE
[FreeTextEntry1] : Procedure: b/L GTB\par The procedure risks was discussed with the patient.\par Indications: therapeutic purposes \par #1 site identified in the Right GTB . Verbal consent was obtained. \par Anesthesia was with ethyl chloride.\par The patient was prepped with betadine solution. \par A 25 gauge 1.5 inch needle was used.\par Injectables: Kenalog 80 mg was injected into the site The Kenalog was mixed with 1mL of xylocaine 1%. \par The patient tolerated the procedure well..\par There were no complications. Handouts/patient instructions were given to patient . patient was instructed to call if redness at site, a decrease in range of motion or an increase in pain is noted after procedure. \par  \par #2 Identical procedure repeated for Left GTB. \par

## 2023-05-08 ENCOUNTER — APPOINTMENT (OUTPATIENT)
Dept: UROGYNECOLOGY | Facility: CLINIC | Age: 73
End: 2023-05-08
Payer: MEDICARE

## 2023-05-08 VITALS — SYSTOLIC BLOOD PRESSURE: 134 MMHG | DIASTOLIC BLOOD PRESSURE: 84 MMHG

## 2023-05-08 PROCEDURE — 99214 OFFICE O/P EST MOD 30 MIN: CPT

## 2023-05-08 NOTE — DISCUSSION/SUMMARY
[FreeTextEntry1] : Urine culture was sent today to exclude UTI.\par I discussed continuing lower dose of nitrofurantoin (50 mg) for end of 3 months versus discontinuing the antibiotic prophylaxis to see if she develops recurrent UTIs.  She would prefer to stop the antibiotic for now..\par She continues to struggle with constipation.  She is taking a teaspoon of Metamucil every day. we discussed strategies to improve bowel regularity and consistency including early breakfast, increasing the dose of the fiber supplement to 1 to 2 tablespoons/day etc.  Post defecation perineal washing etc. was reviewed.\par Follow-up in 4 months.

## 2023-05-08 NOTE — HISTORY OF PRESENT ILLNESS
[FreeTextEntry1] : Patient is a 73-year-old who presents today for follow-up regarding frequent urinary tract infections..\par She was last seen in January 2023.  Her urine culture from 1/31/2023 grew greater than 100,000 CFU of E. coli.  She was treated with twice daily dose of nitrofurantoin and was started on daily dose of nitrofurantoin for UTI prophylaxis.    She has been using vaginal estrogen cream.  Patient denies having any symptoms of urinary tract infection while taking the nitrofurantoin.  She  completed the 3-month course of nitrofurantoin approximately 10 days ago\par Her urine culture from 11/21/2022 returned positive for E. coli. She was treated with Keflex. She repeated the urine culture on 12/5/2022 and it was again positive for E. coli. She was asymptomatic at that time and decided to pursue expectant management. She repeated Urine culture on 12/29/2022 which was again positive for E. coli. She was again treated with Keflex. \par Patient didn't take Hiprex. She states that she had tried it in the past and got white plaques in her mouth. \par She is s/p D&C/ hysteroscopy for thickened endometrium on 11/8/22-benign path

## 2023-05-08 NOTE — ASSESSMENT
[FreeTextEntry1] : Patient is a 73-year-old with recurrent E. coli UTIs who has been UTI free with daily intake of nitrofurantoin.  She is also using vaginal estrogen

## 2023-05-09 LAB
APPEARANCE: CLEAR
BACTERIA: ABNORMAL /HPF
BILIRUBIN URINE: NEGATIVE
BLOOD URINE: ABNORMAL
CAST: 1 /LPF
COLOR: NORMAL
EPITHELIAL CELLS: 8 /HPF
GLUCOSE QUALITATIVE U: NEGATIVE MG/DL
KETONES URINE: NEGATIVE MG/DL
LEUKOCYTE ESTERASE URINE: ABNORMAL
MICROSCOPIC-UA: NORMAL
NITRITE URINE: POSITIVE
PH URINE: 6
PROTEIN URINE: NORMAL MG/DL
RED BLOOD CELLS URINE: 2 /HPF
SPECIFIC GRAVITY URINE: 1.02
UROBILINOGEN URINE: 0.2 MG/DL
WHITE BLOOD CELLS URINE: 798 /HPF

## 2023-05-12 DIAGNOSIS — N39.0 URINARY TRACT INFECTION, SITE NOT SPECIFIED: ICD-10-CM

## 2023-05-12 LAB — BACTERIA UR CULT: ABNORMAL

## 2023-05-24 LAB
APPEARANCE: CLEAR
BACTERIA: NEGATIVE /HPF
BILIRUBIN URINE: NEGATIVE
BLOOD URINE: NEGATIVE
CAST: 0 /LPF
COLOR: YELLOW
EPITHELIAL CELLS: 1 /HPF
GLUCOSE QUALITATIVE U: NEGATIVE MG/DL
KETONES URINE: NEGATIVE MG/DL
LEUKOCYTE ESTERASE URINE: NEGATIVE
MICROSCOPIC-UA: NORMAL
NITRITE URINE: NEGATIVE
PH URINE: 5.5
PROTEIN URINE: NEGATIVE MG/DL
RED BLOOD CELLS URINE: 0 /HPF
SPECIFIC GRAVITY URINE: 1.01
UROBILINOGEN URINE: 0.2 MG/DL
WHITE BLOOD CELLS URINE: 1 /HPF

## 2023-06-06 ENCOUNTER — LABORATORY RESULT (OUTPATIENT)
Age: 73
End: 2023-06-06

## 2023-06-08 ENCOUNTER — MED ADMIN CHARGE (OUTPATIENT)
Age: 73
End: 2023-06-08

## 2023-06-08 ENCOUNTER — APPOINTMENT (OUTPATIENT)
Dept: RHEUMATOLOGY | Facility: CLINIC | Age: 73
End: 2023-06-08
Payer: MEDICARE

## 2023-06-08 VITALS
HEIGHT: 63 IN | OXYGEN SATURATION: 94 % | TEMPERATURE: 97.8 F | WEIGHT: 185 LBS | SYSTOLIC BLOOD PRESSURE: 132 MMHG | DIASTOLIC BLOOD PRESSURE: 82 MMHG | HEART RATE: 78 BPM | BODY MASS INDEX: 32.78 KG/M2

## 2023-06-08 DIAGNOSIS — M17.11 UNILATERAL PRIMARY OSTEOARTHRITIS, RIGHT KNEE: ICD-10-CM

## 2023-06-08 DIAGNOSIS — M17.12 UNILATERAL PRIMARY OSTEOARTHRITIS, LEFT KNEE: ICD-10-CM

## 2023-06-08 PROCEDURE — 99214 OFFICE O/P EST MOD 30 MIN: CPT | Mod: 25

## 2023-06-08 PROCEDURE — 20610 DRAIN/INJ JOINT/BURSA W/O US: CPT | Mod: 50

## 2023-06-08 RX ORDER — ADHESIVE TAPE 3"X 2.3 YD
500 TAPE, NON-MEDICATED TOPICAL
Refills: 0 | Status: DISCONTINUED | COMMUNITY
End: 2023-06-08

## 2023-06-08 RX ORDER — TRIAMCINOLONE ACETONIDE 80 MG/ML
80 INJECTION, SUSPENSION INTRA-ARTICULAR; INTRAMUSCULAR
Qty: 16 | Refills: 0 | Status: COMPLETED | OUTPATIENT
Start: 2023-06-08

## 2023-06-08 RX ADMIN — TRIAMCINOLONE ACETONIDE 0 MG/ML: 80 INJECTION, SUSPENSION INTRA-ARTICULAR; INTRAMUSCULAR at 00:00

## 2023-06-08 NOTE — PROCEDURE
[FreeTextEntry1] : Procedure: b/L GTB\par 6/8/23\par The procedure risks was discussed with the patient.\par Indications: therapeutic purposes \par #1 site identified in the Right GTB . Verbal consent was obtained. \par Anesthesia was with ethyl chloride.\par The patient was prepped with betadine solution. \par A 25 gauge 1.5 inch needle was used.\par Injectables: Kenalog 80 mg was injected into the site The Kenalog was mixed with 1mL of xylocaine 1%. \par The patient tolerated the procedure well..\par There were no complications. Handouts/patient instructions were given to patient . patient was instructed to call if redness at site, a decrease in range of motion or an increase in pain is noted after procedure. \par  \par #2 Identical procedure repeated for Left GTB. \par \par

## 2023-06-08 NOTE — ASSESSMENT
[FreeTextEntry1] : 73  year-old female with polyarticular OA and osteoporosis here for followup.\par \par B/l Knee OA and hip OA.\par Had knee replacements\par Parking sticker form filled out previously\par \par b/L GTB\par B/l GTB injection today6/8/23\par \par Osteoporosis\par -previously receiving prolia from her GYN.  Prior hx before that of fosamax-developed TMJ and Reclast- developed bad infusion reaction of chills/body aches, etc. \par -I have discussed with her other options at her request, including Evenity, Evista, and Forteo.  She will decide and let me know if she would like to start one of these.  She does understand that not doing so may result in increased risk of fracture.  \par \par She is aware to call if her symptoms worsen. \par \par f/u 6 months\par

## 2023-06-08 NOTE — HISTORY OF PRESENT ILLNESS
[FreeTextEntry1] : 73 year-old female with osteoarthritis and osteoporosis presents for pain in b/L outer hip. \par \par Patient reports pain in right outer hip > left. \par Feels she needs CS injection to bursa. \par \par Had knee replacements \par Previously requested handicap parking form filled.\par \par Dr. Aranda used to mnage her osteoporosis, then she had her GYN handle it when she left.  \par She was receiving Prolia from her GYN, but started to develop right sided outer leg hip pain after the shot on two separate occasions, so wished to stop the prolia.  Her last prolia injection was about a year ago. \par \par Prev on Fosamax- TMJ\par With Dr. Aranda previously received reclast- she had diarrhea, chills, so that was stopped. \par \par Her last DEXA is with her GYN, from 2023.  \par \par Had been having frequent UTIs, but latest urine testing was negative.

## 2023-06-09 RX ORDER — TRIAMCINOLONE ACETONIDE 80 MG/ML
80 INJECTION, SUSPENSION INTRA-ARTICULAR; INTRAMUSCULAR
Qty: 1 | Refills: 0 | Status: COMPLETED | OUTPATIENT
Start: 2023-06-09

## 2023-06-15 ENCOUNTER — LABORATORY RESULT (OUTPATIENT)
Age: 73
End: 2023-06-15

## 2023-06-28 ENCOUNTER — NON-APPOINTMENT (OUTPATIENT)
Age: 73
End: 2023-06-28

## 2023-06-28 ENCOUNTER — APPOINTMENT (OUTPATIENT)
Dept: UROGYNECOLOGY | Facility: CLINIC | Age: 73
End: 2023-06-28

## 2023-07-21 ENCOUNTER — RESULT REVIEW (OUTPATIENT)
Age: 73
End: 2023-07-21

## 2023-08-01 ENCOUNTER — APPOINTMENT (OUTPATIENT)
Dept: MRI IMAGING | Facility: CLINIC | Age: 73
End: 2023-08-01
Payer: MEDICARE

## 2023-08-01 ENCOUNTER — OUTPATIENT (OUTPATIENT)
Dept: OUTPATIENT SERVICES | Facility: HOSPITAL | Age: 73
LOS: 1 days | End: 2023-08-01
Payer: MEDICARE

## 2023-08-01 DIAGNOSIS — N39.0 URINARY TRACT INFECTION, SITE NOT SPECIFIED: ICD-10-CM

## 2023-08-01 PROCEDURE — 74183 MRI ABD W/O CNTR FLWD CNTR: CPT | Mod: 26,MH

## 2023-08-01 PROCEDURE — 72197 MRI PELVIS W/O & W/DYE: CPT | Mod: 26,MH

## 2023-08-01 PROCEDURE — 72197 MRI PELVIS W/O & W/DYE: CPT

## 2023-08-01 PROCEDURE — A9585: CPT

## 2023-08-01 PROCEDURE — 74183 MRI ABD W/O CNTR FLWD CNTR: CPT

## 2023-09-11 ENCOUNTER — APPOINTMENT (OUTPATIENT)
Dept: UROGYNECOLOGY | Facility: CLINIC | Age: 73
End: 2023-09-11
Payer: MEDICARE

## 2023-09-11 ENCOUNTER — LABORATORY RESULT (OUTPATIENT)
Age: 73
End: 2023-09-11

## 2023-09-11 DIAGNOSIS — R30.0 DYSURIA: ICD-10-CM

## 2023-09-11 PROCEDURE — 99213 OFFICE O/P EST LOW 20 MIN: CPT | Mod: 25

## 2023-09-11 PROCEDURE — 81003 URINALYSIS AUTO W/O SCOPE: CPT | Mod: QW

## 2023-09-11 PROCEDURE — 51701 INSERT BLADDER CATHETER: CPT | Mod: 59

## 2023-10-10 ENCOUNTER — NON-APPOINTMENT (OUTPATIENT)
Age: 73
End: 2023-10-10

## 2023-10-11 ENCOUNTER — APPOINTMENT (OUTPATIENT)
Dept: GASTROENTEROLOGY | Facility: CLINIC | Age: 73
End: 2023-10-11
Payer: MEDICARE

## 2023-10-11 VITALS
WEIGHT: 185 LBS | OXYGEN SATURATION: 99 % | BODY MASS INDEX: 32.78 KG/M2 | HEART RATE: 90 BPM | DIASTOLIC BLOOD PRESSURE: 83 MMHG | SYSTOLIC BLOOD PRESSURE: 114 MMHG | HEIGHT: 63 IN

## 2023-10-11 PROCEDURE — 99204 OFFICE O/P NEW MOD 45 MIN: CPT

## 2023-10-12 DIAGNOSIS — K86.2 CYST OF PANCREAS: ICD-10-CM

## 2023-11-09 ENCOUNTER — APPOINTMENT (OUTPATIENT)
Dept: RHEUMATOLOGY | Facility: CLINIC | Age: 73
End: 2023-11-09
Payer: MEDICARE

## 2023-11-09 VITALS
BODY MASS INDEX: 32.78 KG/M2 | OXYGEN SATURATION: 96 % | TEMPERATURE: 96 F | HEIGHT: 63 IN | SYSTOLIC BLOOD PRESSURE: 124 MMHG | DIASTOLIC BLOOD PRESSURE: 75 MMHG | HEART RATE: 81 BPM | WEIGHT: 185 LBS

## 2023-11-09 DIAGNOSIS — M54.2 CERVICALGIA: ICD-10-CM

## 2023-11-09 DIAGNOSIS — M70.61 TROCHANTERIC BURSITIS, RIGHT HIP: ICD-10-CM

## 2023-11-09 DIAGNOSIS — M81.0 AGE-RELATED OSTEOPOROSIS W/OUT CURRENT PATHOLOGICAL FRACTURE: ICD-10-CM

## 2023-11-09 PROCEDURE — 20610 DRAIN/INJ JOINT/BURSA W/O US: CPT | Mod: 50

## 2023-11-09 PROCEDURE — 99214 OFFICE O/P EST MOD 30 MIN: CPT | Mod: 25

## 2023-11-09 RX ORDER — ESTRADIOL 0.1 MG/G
0.1 CREAM VAGINAL
Qty: 40 | Refills: 2 | Status: DISCONTINUED | COMMUNITY
Start: 2018-04-13 | End: 2023-11-09

## 2023-11-09 RX ORDER — NITROFURANTOIN MONOHYDRATE/MACROCRYSTALLINE 25; 75 MG/1; MG/1
100 CAPSULE ORAL
Qty: 114 | Refills: 0 | Status: DISCONTINUED | COMMUNITY
Start: 2023-06-28 | End: 2023-11-09

## 2023-11-09 RX ORDER — CEFUROXIME AXETIL 500 MG/1
500 TABLET ORAL
Qty: 14 | Refills: 0 | Status: DISCONTINUED | COMMUNITY
Start: 2022-04-25 | End: 2023-11-09

## 2023-11-09 RX ORDER — NITROFURANTOIN (MONOHYDRATE/MACROCRYSTALS) 25; 75 MG/1; MG/1
100 CAPSULE ORAL DAILY
Qty: 90 | Refills: 0 | Status: DISCONTINUED | COMMUNITY
Start: 2023-06-20 | End: 2023-11-09

## 2023-11-09 RX ORDER — CEPHALEXIN 500 MG/1
500 CAPSULE ORAL TWICE DAILY
Qty: 14 | Refills: 0 | Status: DISCONTINUED | COMMUNITY
Start: 2023-06-19 | End: 2023-11-09

## 2023-11-09 RX ORDER — SULFAMETHOXAZOLE AND TRIMETHOPRIM 800; 160 MG/1; MG/1
800-160 TABLET ORAL TWICE DAILY
Qty: 10 | Refills: 0 | Status: DISCONTINUED | COMMUNITY
Start: 2023-05-26 | End: 2023-11-09

## 2023-11-09 RX ORDER — TRIAMCINOLONE ACETONIDE 80 MG/ML
80 INJECTION, SUSPENSION INTRA-ARTICULAR; INTRAMUSCULAR
Qty: 16 | Refills: 0 | Status: COMPLETED | OUTPATIENT
Start: 2023-11-09

## 2023-11-09 RX ORDER — CYCLOBENZAPRINE HYDROCHLORIDE 5 MG/1
5 TABLET, FILM COATED ORAL
Qty: 60 | Refills: 3 | Status: ACTIVE | COMMUNITY
Start: 2017-08-02

## 2023-11-09 RX ORDER — CEPHALEXIN 250 MG/1
250 CAPSULE ORAL DAILY
Qty: 30 | Refills: 0 | Status: DISCONTINUED | COMMUNITY
Start: 2022-10-07 | End: 2023-11-09

## 2023-11-09 RX ORDER — NITROFURANTOIN MACROCRYSTALS 50 MG/1
50 CAPSULE ORAL DAILY
Qty: 90 | Refills: 3 | Status: DISCONTINUED | COMMUNITY
Start: 2023-05-12 | End: 2023-11-09

## 2023-11-09 RX ORDER — NITROFURANTOIN MONOHYDRATE/MACROCRYSTALLINE 25; 75 MG/1; MG/1
100 CAPSULE ORAL DAILY
Qty: 90 | Refills: 0 | Status: DISCONTINUED | COMMUNITY
Start: 2023-01-31 | End: 2023-11-09

## 2023-11-09 RX ORDER — CEPHALEXIN 250 MG/1
250 TABLET ORAL
Qty: 90 | Refills: 0 | Status: DISCONTINUED | COMMUNITY
Start: 2023-09-11 | End: 2023-11-09

## 2023-11-09 RX ORDER — PHENAZOPYRIDINE HYDROCHLORIDE 200 MG/1
200 TABLET ORAL
Qty: 6 | Refills: 0 | Status: DISCONTINUED | COMMUNITY
Start: 2022-03-10 | End: 2023-11-09

## 2023-11-09 RX ORDER — CEPHALEXIN 500 MG/1
500 CAPSULE ORAL
Qty: 14 | Refills: 0 | Status: DISCONTINUED | COMMUNITY
Start: 2022-01-14 | End: 2023-11-09

## 2023-11-09 RX ORDER — SULFAMETHOXAZOLE AND TRIMETHOPRIM 800; 160 MG/1; MG/1
800-160 TABLET ORAL TWICE DAILY
Qty: 10 | Refills: 0 | Status: DISCONTINUED | COMMUNITY
Start: 2022-02-25 | End: 2023-11-09

## 2023-11-09 RX ORDER — CEPHALEXIN 250 MG/1
250 CAPSULE ORAL
Qty: 118 | Refills: 0 | Status: DISCONTINUED | COMMUNITY
Start: 2022-07-07 | End: 2023-11-09

## 2023-11-09 RX ORDER — ESTRADIOL 0.1 MG/G
0.1 CREAM VAGINAL
Qty: 1 | Refills: 2 | Status: DISCONTINUED | COMMUNITY
Start: 2022-04-05 | End: 2023-11-09

## 2023-11-09 RX ORDER — SULFAMETHOXAZOLE AND TRIMETHOPRIM 800; 160 MG/1; MG/1
800-160 TABLET ORAL TWICE DAILY
Qty: 6 | Refills: 0 | Status: DISCONTINUED | COMMUNITY
Start: 2023-05-12 | End: 2023-11-09

## 2023-11-09 RX ORDER — CEPHALEXIN 500 MG/1
500 TABLET ORAL TWICE DAILY
Qty: 6 | Refills: 0 | Status: DISCONTINUED | COMMUNITY
Start: 2022-11-23 | End: 2023-11-09

## 2023-11-09 RX ORDER — NITROFURANTOIN MACROCRYSTALS 50 MG/1
50 CAPSULE ORAL DAILY
Qty: 90 | Refills: 3 | Status: DISCONTINUED | COMMUNITY
Start: 2023-05-15 | End: 2023-11-09

## 2023-11-09 RX ORDER — SULFAMETHOXAZOLE AND TRIMETHOPRIM 800; 160 MG/1; MG/1
800-160 TABLET ORAL TWICE DAILY
Qty: 14 | Refills: 0 | Status: DISCONTINUED | COMMUNITY
Start: 2022-11-04 | End: 2023-11-09

## 2023-11-09 RX ORDER — CEPHALEXIN 500 MG/1
500 CAPSULE ORAL
Qty: 14 | Refills: 0 | Status: DISCONTINUED | COMMUNITY
Start: 2023-01-03 | End: 2023-11-09

## 2023-11-09 RX ORDER — TRAMADOL HYDROCHLORIDE 50 MG/1
50 TABLET, COATED ORAL
Qty: 120 | Refills: 1 | Status: DISCONTINUED | COMMUNITY
Start: 2017-08-02 | End: 2023-11-09

## 2023-11-09 RX ORDER — METHENAMINE HIPPURATE 1 G/1
1 TABLET ORAL
Qty: 30 | Refills: 2 | Status: DISCONTINUED | COMMUNITY
Start: 2022-01-14 | End: 2023-11-09

## 2023-11-09 RX ORDER — PHENAZOPYRIDINE 100 MG/1
100 TABLET, FILM COATED ORAL 3 TIMES DAILY
Qty: 20 | Refills: 1 | Status: DISCONTINUED | COMMUNITY
Start: 2022-04-05 | End: 2023-11-09

## 2023-11-09 RX ORDER — METHENAMINE HIPPURATE 1 G/1
1 TABLET ORAL TWICE DAILY
Qty: 60 | Refills: 2 | Status: DISCONTINUED | COMMUNITY
Start: 2022-11-21 | End: 2023-11-09

## 2023-11-09 RX ADMIN — TRIAMCINOLONE ACETONIDE 0 MG/ML: 80 INJECTION, SUSPENSION INTRA-ARTICULAR; INTRAMUSCULAR at 00:00

## 2024-01-03 LAB
APPEARANCE: ABNORMAL
BACTERIA: ABNORMAL /HPF
BILIRUBIN URINE: ABNORMAL
BLOOD URINE: ABNORMAL
CAST: 0 /LPF
COLOR: ABNORMAL
EPITHELIAL CELLS: 1 /HPF
GLUCOSE QUALITATIVE U: NEGATIVE MG/DL
KETONES URINE: NEGATIVE MG/DL
LEUKOCYTE ESTERASE URINE: ABNORMAL
MICROSCOPIC-UA: NORMAL
NITRITE URINE: POSITIVE
PH URINE: 5.5
PROTEIN URINE: 100 MG/DL
RED BLOOD CELLS URINE: >1900 /HPF
REVIEW: NORMAL
SPECIFIC GRAVITY URINE: 1.02
UROBILINOGEN URINE: 0.2 MG/DL
WHITE BLOOD CELLS URINE: 171 /HPF

## 2024-01-08 ENCOUNTER — APPOINTMENT (OUTPATIENT)
Dept: UROGYNECOLOGY | Facility: CLINIC | Age: 74
End: 2024-01-08

## 2024-01-12 ENCOUNTER — APPOINTMENT (OUTPATIENT)
Dept: UROGYNECOLOGY | Facility: CLINIC | Age: 74
End: 2024-01-12
Payer: MEDICARE

## 2024-01-12 PROCEDURE — 81003 URINALYSIS AUTO W/O SCOPE: CPT | Mod: QW

## 2024-01-12 PROCEDURE — 99214 OFFICE O/P EST MOD 30 MIN: CPT

## 2024-01-12 RX ORDER — MIRABEGRON 25 MG/1
25 TABLET, FILM COATED, EXTENDED RELEASE ORAL
Qty: 30 | Refills: 2 | Status: ACTIVE | COMMUNITY
Start: 2024-01-12 | End: 1900-01-01

## 2024-01-12 NOTE — DISCUSSION/SUMMARY
[FreeTextEntry1] : Following management plan was outlined after having a detailed discussion with the patient: 1.  UA and culture was ordered on voided urine specimen.  If culture returns positive and she is having symptoms, will recommend treatment with either Bactrim -160 mg p.o. twice daily for 7 days or Macrobid 100 mg p.o. twice daily for 7 days.  If she is not symptomatic, will hold off treatment. 2.  We may have to resume either Macrodantin 50 mg p.o. daily or Keflex 250 mg p.o. daily for another 3 to 6 months. 3.  Recommended compliance with use of vaginal estradiol 10 mcg suppositories at bedtime every night for 2 weeks followed by 2-3 times per week.  Explained to her that the vaginal estradiol supplementation is to prevent frequent urine infections 4.  Discussed perineal washing etc. 5.  She was pads continuously for intermittent urine leakage with urgency.  Offered her a trial of Myrbetriq 25 mg p.o. daily her hypertension is well-controlled.  Prescription for Myrbetriq 25 mg was sent to her pharmacy.  She has a follow-up appointment with her PCP in February.  I advised her to get her blood pressure checked during that appointment and if it is elevated, she should call our office. 5.  Prescription for repeat urine culture as well as dry sterile cups were provided to the patient 6.  Follow-up in 3 months with IRINA Petersen.  Will do a pelvic exam on follow-up visit and we will check her postvoid residual

## 2024-01-12 NOTE — HISTORY OF PRESENT ILLNESS
[FreeTextEntry1] : Patient is a 73-year-old who presents today for follow-up regarding frequent urinary tract infections which she has been struggling with since 2016. Patient states that she got COVID ~ 1 week before [place]. She was taking low dose of Keflex 250 mg but just finished it around 12/28/2023.  She reports that she developed sudden onset of dysuria with blood. 1/2/24 urine culture grew greater than 100,000 CFU of E. coli. She decided to discontinue vaginal estradiol cream since last visit. She completed the course of Keflex 500 mg p.o. twice daily for 7 days few days ago.  She did not she feels that the infection went away. She is no longer on any antibiotic.  However she was started on Yuvafem vaginal suppositories which she has been using for the past 3 nights. She reports denies having constipation or diarrhea She is leaving for a trip in March and is nervous about having another UTI

## 2024-01-12 NOTE — ASSESSMENT
[FreeTextEntry1] : Patient is a 73-year-old with recurrent urinary tract infections since 2016.  Patient had cystoscopy as well as renal imaging few times.  She has diverticulosis without diverticulitis.  She responds well to antibiotic prophylaxis however she developed a UTI soon after discontinuing antibiotic prophylaxis.

## 2024-01-16 LAB
APPEARANCE: ABNORMAL
BACTERIA: NEGATIVE /HPF
BILIRUBIN URINE: NEGATIVE
BLOOD URINE: NEGATIVE
CAST: 0 /LPF
COLOR: YELLOW
EPITHELIAL CELLS: 2 /HPF
GLUCOSE QUALITATIVE U: NEGATIVE MG/DL
KETONES URINE: NEGATIVE MG/DL
LEUKOCYTE ESTERASE URINE: ABNORMAL
MICROSCOPIC-UA: NORMAL
NITRITE URINE: NEGATIVE
PH URINE: 5.5
PROTEIN URINE: NEGATIVE MG/DL
RED BLOOD CELLS URINE: 1 /HPF
SPECIFIC GRAVITY URINE: 1.02
UROBILINOGEN URINE: 0.2 MG/DL
WHITE BLOOD CELLS URINE: 2 /HPF

## 2024-01-17 LAB — BACTERIA UR CULT: NORMAL

## 2024-01-23 LAB
APPEARANCE: CLEAR
BACTERIA: NEGATIVE /HPF
BILIRUBIN URINE: NEGATIVE
BLOOD URINE: NEGATIVE
CAST: 0 /LPF
COLOR: YELLOW
EPITHELIAL CELLS: 1 /HPF
GLUCOSE QUALITATIVE U: NEGATIVE MG/DL
KETONES URINE: NEGATIVE MG/DL
LEUKOCYTE ESTERASE URINE: ABNORMAL
MICROSCOPIC-UA: NORMAL
NITRITE URINE: NEGATIVE
PH URINE: 6.5
PROTEIN URINE: NEGATIVE MG/DL
RED BLOOD CELLS URINE: 0 /HPF
SPECIFIC GRAVITY URINE: 1.01
UROBILINOGEN URINE: 0.2 MG/DL
WHITE BLOOD CELLS URINE: 20 /HPF

## 2024-01-27 LAB — BACTERIA UR CULT: ABNORMAL

## 2024-01-27 RX ORDER — NITROFURANTOIN (MONOHYDRATE/MACROCRYSTALS) 25; 75 MG/1; MG/1
100 CAPSULE ORAL DAILY
Qty: 90 | Refills: 0 | Status: ACTIVE | COMMUNITY
Start: 2024-01-27 | End: 1900-01-01

## 2024-01-29 RX ORDER — NITROFURANTOIN MONOHYDRATE/MACROCRYSTALLINE 25; 75 MG/1; MG/1
100 CAPSULE ORAL
Qty: 90 | Refills: 0 | Status: ACTIVE | COMMUNITY
Start: 2024-01-29 | End: 1900-01-01

## 2024-02-06 ENCOUNTER — LABORATORY RESULT (OUTPATIENT)
Age: 74
End: 2024-02-06

## 2024-04-15 ENCOUNTER — APPOINTMENT (OUTPATIENT)
Dept: UROGYNECOLOGY | Facility: CLINIC | Age: 74
End: 2024-04-15
Payer: MEDICARE

## 2024-04-15 DIAGNOSIS — N39.0 URINARY TRACT INFECTION, SITE NOT SPECIFIED: ICD-10-CM

## 2024-04-15 DIAGNOSIS — K59.00 CONSTIPATION, UNSPECIFIED: ICD-10-CM

## 2024-04-15 DIAGNOSIS — R35.1 NOCTURIA: ICD-10-CM

## 2024-04-15 PROCEDURE — 99214 OFFICE O/P EST MOD 30 MIN: CPT

## 2024-04-15 NOTE — ADDENDUM
[FreeTextEntry1] : This note was written by Leola Valencia, acting as the  for Dr. Nj. This note accurately reflects the work and decisions made by Dr. Nj.

## 2024-04-15 NOTE — HISTORY OF PRESENT ILLNESS
[FreeTextEntry1] : Patient is a 73-year-old who presents today for follow-up regarding frequent urinary tract infections which she has been struggling with since 2016. Patient last seen in office 01/12/2024. Gross hematuria during UTI. Pt's MRI of Abdomen & Pelvis 08/2023 showed no acute pathology. Colonic diverticulosis, without diverticulitis. Bladder is normal in appearance. No evidence of an entero-vesicular fistula. Pt is currently managed with Macrobid 100mg po daily ppx. Her last culture from 02/06/24 is negative. Her last couple of cultures are as follows: Negative Ua, cx 02/06/2024  Positive Urine Culture 01/22/2024, >100,000 CFU/ml Escherichia coli - treated with Keflex 7 days Ua with micro 01/22/2024 - large leukocyte esterase, 20 WBCs, 0 RBCs Ua with micro 01/15/2024 - trace leukocyte esterase, turbid  Negative Urine Culture 01/15/2024  Positive Urine Culture 01/02/2024, >100,000 CFU/ml Escherichia coli - treated with Keflex 7 days Ua with micro 01/02/2024 - turbid, red, 100mg/dL protein, large blood, small bilirubin, moderate leukocyte esterase, positive nitrite, 171 WBCs, >1900 RBCs, few bacteria.  Pt is here for two month follow up. Pt reports she got very sick with allergies during her out of state trip for wedding. She had to stop Nitrofurantoin and started Cefdinir and prednisone.  After completion of cefdinir she started Nitrofurantoin 100mg prophylaxes again and reports have two to three months left. Pt reports no breakthrough UTI and have been feeling great.  Pt reports taking d'mannose for recurrent UTI. Pt reports her GYN recommended her to take Uqora and d'mannose together. Reports using vaginal suppositories twice weekly w/o any side effects. Pt reports she never took Myrbetriq because she was dealing with her sickness and allergies during vacation and didn't want to add one more thing. Reports nocturia 2-3 times at night and urge incontinence at times. Denies urinary/UTI symptoms, constipation, pelvic pain, and sensation of incomplete bladder emptying.

## 2024-04-15 NOTE — ASSESSMENT
[FreeTextEntry1] : Patient is a 73-year-old who presents today for follow-up regarding frequent urinary tract infections which she has been struggling with since 2016. Pt is using vaginal suppositories twice a week and Nitrofurantoin 100mg once daily w/o breakthrough UTI. Notes nocturia and urgency.

## 2024-04-15 NOTE — DISCUSSION/SUMMARY
[FreeTextEntry1] : Following management plan was outlined after detailed discussion with patient. [] Pt is taking Prophylaxis antibiotics. She doesn't have any symptoms. There is no need for UA and culture today. Advised her to drop off urine in the [facility] when symptomatic.  [] Continue Macrobid 100mg po daily ppx until completely finished.  [] Continue vaginal estradiol 10mcg suppositories biweekly in the vagina at bedtime.  [] Advised her to start taking Myrbetriq 25mg daily for nocturia and urgency. Pt agreeable with plan. Pt has BP machine at home and continue to check her BP at home. Pt already have prescription for this at home.  [] Pt can discontinue d'mannose and continue taking uqora for recurrent UTI.  [] Follow up in one month for Myrbetriq  [] Instructed to call with any questions or concerns and she verbalizes understanding.

## 2024-04-26 ENCOUNTER — RX RENEWAL (OUTPATIENT)
Age: 74
End: 2024-04-26

## 2024-04-26 RX ORDER — ESTRADIOL 10 UG/1
10 TABLET VAGINAL
Qty: 18 | Refills: 6 | Status: ACTIVE | COMMUNITY
Start: 2024-04-15 | End: 1900-01-01

## 2024-05-08 ENCOUNTER — APPOINTMENT (OUTPATIENT)
Dept: RHEUMATOLOGY | Facility: CLINIC | Age: 74
End: 2024-05-08
Payer: MEDICARE

## 2024-05-08 VITALS
SYSTOLIC BLOOD PRESSURE: 140 MMHG | BODY MASS INDEX: 32.78 KG/M2 | HEART RATE: 72 BPM | TEMPERATURE: 97.1 F | DIASTOLIC BLOOD PRESSURE: 78 MMHG | OXYGEN SATURATION: 98 % | WEIGHT: 185 LBS | HEIGHT: 63 IN

## 2024-05-08 DIAGNOSIS — M70.61 TROCHANTERIC BURSITIS, RIGHT HIP: ICD-10-CM

## 2024-05-08 DIAGNOSIS — M53.86 OTHER SPECIFIED DORSOPATHIES, LUMBAR REGION: ICD-10-CM

## 2024-05-08 DIAGNOSIS — M76.32 ILIOTIBIAL BAND SYNDROME, LEFT LEG: ICD-10-CM

## 2024-05-08 DIAGNOSIS — M70.62 TROCHANTERIC BURSITIS, LEFT HIP: ICD-10-CM

## 2024-05-08 DIAGNOSIS — M76.30 ILIOTIBIAL BAND SYNDROME, UNSPECIFIED LEG: ICD-10-CM

## 2024-05-08 PROCEDURE — 99214 OFFICE O/P EST MOD 30 MIN: CPT | Mod: 25

## 2024-05-08 PROCEDURE — 20610 DRAIN/INJ JOINT/BURSA W/O US: CPT | Mod: 50

## 2024-05-08 RX ORDER — TRIAMCINOLONE ACETONIDE 80 MG/ML
80 INJECTION, SUSPENSION INTRA-ARTICULAR; INTRAMUSCULAR
Qty: 8 | Refills: 0 | Status: COMPLETED | OUTPATIENT
Start: 2024-05-08

## 2024-05-08 RX ORDER — PHENAZOPYRIDINE 200 MG/1
200 TABLET, FILM COATED ORAL 3 TIMES DAILY
Qty: 15 | Refills: 0 | Status: DISCONTINUED | COMMUNITY
Start: 2024-01-23 | End: 2024-05-08

## 2024-05-08 RX ORDER — DICLOFENAC POTASSIUM 50 MG/1
50 TABLET, COATED ORAL
Qty: 20 | Refills: 0 | Status: ACTIVE | COMMUNITY
Start: 2024-05-08 | End: 1900-01-01

## 2024-05-08 RX ORDER — ESTRADIOL 10 UG/1
10 TABLET, FILM COATED VAGINAL
Qty: 18 | Refills: 6 | Status: DISCONTINUED | COMMUNITY
Start: 2024-01-02 | End: 2024-05-08

## 2024-05-08 RX ORDER — CEPHALEXIN 500 MG/1
500 CAPSULE ORAL
Qty: 14 | Refills: 0 | Status: DISCONTINUED | COMMUNITY
Start: 2024-01-02 | End: 2024-05-08

## 2024-05-08 RX ADMIN — TRIAMCINOLONE ACETONIDE 0 MG/ML: 80 INJECTION, SUSPENSION INTRA-ARTICULAR; INTRAMUSCULAR at 00:00

## 2024-05-13 LAB
ALBUMIN SERPL ELPH-MCNC: 4.4 G/DL
ALP BLD-CCNC: 88 U/L
ALT SERPL-CCNC: 7 U/L
ANION GAP SERPL CALC-SCNC: 14 MMOL/L
AST SERPL-CCNC: 20 U/L
BILIRUB SERPL-MCNC: 0.5 MG/DL
BUN SERPL-MCNC: 18 MG/DL
CALCIUM SERPL-MCNC: 9.8 MG/DL
CHLORIDE SERPL-SCNC: 101 MMOL/L
CO2 SERPL-SCNC: 24 MMOL/L
CREAT SERPL-MCNC: 0.77 MG/DL
EGFR: 80 ML/MIN/1.73M2
GLUCOSE SERPL-MCNC: 94 MG/DL
POTASSIUM SERPL-SCNC: 4.2 MMOL/L
PROT SERPL-MCNC: 7.4 G/DL
SODIUM SERPL-SCNC: 139 MMOL/L

## 2024-05-20 ENCOUNTER — APPOINTMENT (OUTPATIENT)
Dept: UROGYNECOLOGY | Facility: CLINIC | Age: 74
End: 2024-05-20
Payer: MEDICARE

## 2024-05-20 VITALS
HEIGHT: 63 IN | DIASTOLIC BLOOD PRESSURE: 80 MMHG | BODY MASS INDEX: 32.78 KG/M2 | SYSTOLIC BLOOD PRESSURE: 145 MMHG | WEIGHT: 185 LBS

## 2024-05-20 DIAGNOSIS — N81.9 FEMALE GENITAL PROLAPSE, UNSPECIFIED: ICD-10-CM

## 2024-05-20 DIAGNOSIS — R35.0 FREQUENCY OF MICTURITION: ICD-10-CM

## 2024-05-20 DIAGNOSIS — R32 UNSPECIFIED URINARY INCONTINENCE: ICD-10-CM

## 2024-05-20 DIAGNOSIS — R39.15 URGENCY OF URINATION: ICD-10-CM

## 2024-05-20 DIAGNOSIS — N32.81 OVERACTIVE BLADDER: ICD-10-CM

## 2024-05-20 DIAGNOSIS — N95.2 POSTMENOPAUSAL ATROPHIC VAGINITIS: ICD-10-CM

## 2024-05-20 PROCEDURE — 99214 OFFICE O/P EST MOD 30 MIN: CPT

## 2024-05-20 NOTE — HISTORY OF PRESENT ILLNESS
[FreeTextEntry1] : Patient is a 73-year-old who presents today for follow-up regarding frequent urinary tract infections which she has been struggling with since 2016. Patient last seen in office 01/12/2024. Gross hematuria during UTI. Pt's MRI of Abdomen & Pelvis 08/2023 showed no acute pathology. Colonic diverticulosis, without diverticulitis. Bladder is normal in appearance. No evidence of an entero-vesicular fistula. Pt is currently managed with Macrobid 100mg po daily ppx. Her last culture from 02/06/24 is negative. Her last couple of cultures are as follows: Negative Ua, cx 02/06/2024 Positive Urine Culture 01/22/2024, >100,000 CFU/ml Escherichia coli - treated with Keflex 7 days Ua with micro 01/22/2024 - large leukocyte esterase, 20 WBCs, 0 RBCs Ua with micro 01/15/2024 - trace leukocyte esterase, turbid Negative Urine Culture 01/15/2024 Positive Urine Culture 01/02/2024, >100,000 CFU/ml Escherichia coli - treated with Keflex 7 days Ua with micro 01/02/2024 - turbid, red, 100mg/dL protein, large blood, small bilirubin, moderate leukocyte esterase, positive nitrite, 171 WBCs, >1900 RBCs, few bacteria.  Pt is here for one month follow up for medication. States she never started Myrbetriq 25mg because of its side effects that she read online. States she was also taking antibiotics so decided not to start new medication while on abx. Pt states she finished 90 days course of Nitrofurantoin 100mg one tab daily a week ago without breakthrough UTI. Denies urinary/UTI symptoms, sensation of incomplete bladder emptying, constipation. States she didn't start Uqora with the same concern. Reports using vaginal suppositories twice weekly w/o any side effects.

## 2024-05-20 NOTE — DISCUSSION/SUMMARY
[FreeTextEntry1] : [] Voided urine sample is sent to the lab for UA and culture to rule out UTI.  If she has evidence of urinary tract infection, will consider treating with Bactrim p.o. twice daily for 7 days or Keflex 500 mg p.o. twice daily for 7 days. [] Scripts for UA and culture provided to the pt for possible future UTI. [] Continue vaginal estradiol 10mcg suppositories biweekly in the vagina at bedtime. [] Pt can take Uqora for UTI prevention. [] Advised her to start taking Myrbetriq 25mg  PO QD daily for urgency and nocturia. Pt already has prescription at home.  [] Follow up in three months. [] Instructed to call with any questions or concerns and she verbalizes understanding.

## 2024-05-20 NOTE — ASSESSMENT
[FreeTextEntry1] : Flaca w/co recurrent UTI was on ppx Nitrofurantoin since 01/22/24 that she just finished. Pt takes vaginal estradiol suppositories twice a week. Pt yet to start Myrbetriq 25mg for nocturia and urgency.

## 2024-05-20 NOTE — PHYSICAL EXAM
[FreeTextEntry1] : General: Not in acute distress, alert and oriented x3. Abdomen: Soft, nontender, and nondistended. No obvious hepatosplenomegaly. No obvious hernias.

## 2024-05-21 LAB
APPEARANCE: CLEAR
BACTERIA: NEGATIVE /HPF
BILIRUBIN URINE: NEGATIVE
BLOOD URINE: NEGATIVE
CAST: 0 /LPF
COLOR: YELLOW
EPITHELIAL CELLS: 1 /HPF
GLUCOSE QUALITATIVE U: NEGATIVE MG/DL
KETONES URINE: NEGATIVE MG/DL
LEUKOCYTE ESTERASE URINE: NEGATIVE
MICROSCOPIC-UA: NORMAL
NITRITE URINE: NEGATIVE
PH URINE: 6.5
PROTEIN URINE: NEGATIVE MG/DL
RED BLOOD CELLS URINE: 1 /HPF
SPECIFIC GRAVITY URINE: 1.02
UROBILINOGEN URINE: 0.2 MG/DL
WHITE BLOOD CELLS URINE: 0 /HPF

## 2024-05-22 LAB — BACTERIA UR CULT: NORMAL

## 2024-05-31 RX ORDER — CEPHALEXIN 500 MG/1
500 CAPSULE ORAL
Qty: 14 | Refills: 0 | Status: ACTIVE | COMMUNITY
Start: 2024-05-31 | End: 1900-01-01

## 2024-06-03 LAB
APPEARANCE: CLEAR
BACTERIA: ABNORMAL /HPF
BILIRUBIN URINE: NEGATIVE
BLOOD URINE: NEGATIVE
CAST: 0 /LPF
COLOR: YELLOW
EPITHELIAL CELLS: 1 /HPF
GLUCOSE QUALITATIVE U: NEGATIVE MG/DL
KETONES URINE: NEGATIVE MG/DL
LEUKOCYTE ESTERASE URINE: ABNORMAL
MICROSCOPIC-UA: NORMAL
NITRITE URINE: POSITIVE
PH URINE: 6.5
PROTEIN URINE: NEGATIVE MG/DL
RED BLOOD CELLS URINE: 2 /HPF
SPECIFIC GRAVITY URINE: 1.01
UROBILINOGEN URINE: 0.2 MG/DL
WHITE BLOOD CELLS URINE: 173 /HPF

## 2024-06-07 RX ORDER — ESTRADIOL 0.1 MG/G
0.1 CREAM VAGINAL
Qty: 1 | Refills: 3 | Status: ACTIVE | COMMUNITY
Start: 2024-06-07 | End: 1900-01-01

## 2024-06-11 LAB
APPEARANCE: CLEAR
BACTERIA: NEGATIVE /HPF
BILIRUBIN URINE: NEGATIVE
BLOOD URINE: NEGATIVE
CAST: 0 /LPF
COLOR: YELLOW
EPITHELIAL CELLS: 3 /HPF
GLUCOSE QUALITATIVE U: NEGATIVE MG/DL
KETONES URINE: NEGATIVE MG/DL
LEUKOCYTE ESTERASE URINE: NEGATIVE
MICROSCOPIC-UA: NORMAL
NITRITE URINE: NEGATIVE
PH URINE: 7
PROTEIN URINE: NEGATIVE MG/DL
RED BLOOD CELLS URINE: 0 /HPF
SPECIFIC GRAVITY URINE: 1.01
UROBILINOGEN URINE: 0.2 MG/DL
WHITE BLOOD CELLS URINE: 0 /HPF

## 2024-06-14 RX ORDER — CEPHALEXIN 500 MG/1
500 CAPSULE ORAL
Qty: 14 | Refills: 0 | Status: ACTIVE | COMMUNITY
Start: 2024-06-14 | End: 1900-01-01

## 2024-06-24 RX ORDER — CEPHALEXIN 250 MG/1
250 CAPSULE ORAL
Qty: 90 | Refills: 0 | Status: ACTIVE | COMMUNITY
Start: 2024-06-24 | End: 1900-01-01

## 2024-07-03 ENCOUNTER — NON-APPOINTMENT (OUTPATIENT)
Age: 74
End: 2024-07-03

## 2024-07-05 ENCOUNTER — NON-APPOINTMENT (OUTPATIENT)
Age: 74
End: 2024-07-05

## 2024-08-05 ENCOUNTER — NON-APPOINTMENT (OUTPATIENT)
Age: 74
End: 2024-08-05

## 2024-09-18 ENCOUNTER — APPOINTMENT (OUTPATIENT)
Dept: UROGYNECOLOGY | Facility: CLINIC | Age: 74
End: 2024-09-18

## 2024-09-18 DIAGNOSIS — R39.15 URGENCY OF URINATION: ICD-10-CM

## 2024-09-18 PROCEDURE — 99214 OFFICE O/P EST MOD 30 MIN: CPT

## 2024-09-18 NOTE — HISTORY OF PRESENT ILLNESS
[FreeTextEntry1] : Patient is a 74-year-old who presents today for follow-up regarding frequent urinary tract infections which she has been struggling with since 2016. Patient last seen in office in April 2024. Gross hematuria during UTI. Pt's MRI of Abdomen & Pelvis 08/2023 showed no acute pathology. Colonic diverticulosis, without diverticulitis. Bladder is normal in appearance. No evidence of an entero-vesicular fistula. Patient has been placed on prolonged courses of daily antibiotics for UTI prophylaxis several times.  Typically as soon as she stops antibiotic prophylaxis, UTIs recur.  She has been on Keflex 250 mg p.o. daily for 90 days starting in June 2024.  She finished the 3-month dose of Keflex last week.  She states that so far she is doing well and has no concerns for urinary tract infection today. Most recent positive urine culture was from 7/30/2024 when she grew 10-49,000 CFU per mL of Proteus.  She was taking Keflex 250 mg p.o. daily for UTI prophylaxis at that time and was asymptomatic.  Additional antibiotics were not prescribed Urine culture 7/2024 was negative Urine culture 6/10/2024: 10-49,000 CFU per mL of Proteus Urine culture 5/31/2024 greater than 100,000 CFU per mL of Klebsiella Urine culture 5/22/2024 was negative Negative Ua, cx 02/06/2024 Positive Urine Culture 01/22/2024, >100,000 CFU/ml Escherichia coli - treated with Keflex 7 days Negative Urine Culture 01/15/2024 Positive Urine Culture 01/02/2024, >100,000 CFU/ml Escherichia coli - treated with Keflex 7 days  patient is currently taking A 3 STEP REGIMEN OF UQORA. She is on part 2 She was switched to vaginal estradiol cream 0.1 mg/g back in June she reports using it twice a week She decided not to use Myrbetriq.  She does report urinary urgency and urine leakage requiring her to wear pads continuously however she does not want to use any medication.  She reports intermittent itching on the vulva likely from the use of pads.: Bowel symptoms: Bowel movements remain irregular however she did not denies diarrhea or fecal incontinence.  She is taking probiotic

## 2024-09-18 NOTE — ASSESSMENT
[FreeTextEntry1] : Patient with recurrent urinary tract infections, overactive bladder symptoms, the use of incontinence pads as well as irritable bowel.  She just completed a course of 3 months of antibiotics.  In the past her urine infections return within a month of stopping the antibiotic.  She denies any UTI symptoms today

## 2024-09-18 NOTE — DISCUSSION/SUMMARY
[FreeTextEntry1] : Following management plan was outlined after detailed discussion with patient. [] Require recommended checking urine for infection only if she has concerning symptoms such as dysuria, change in urinary frequency baseline urgency/hematuria etc.  She verbalized understanding.  She is asymptomatic.  The collected urine was discarded.  Patient was informed that we are not sending a urine culture today [] Advised patient to call the office if she has any for any concerning symptoms for urinary tract infection.  She is aware that we will do a urine culture and treat her based on there is the results.  She typically responds well to Keflex 500 mg p.o. twice daily for 7 days.  If your infections return, we will restart Keflex 250 mg p.o. daily for 180 days on nitrofurantoin 100 mg p.o. daily [id number] days [] Continue vaginal estradiol cream 0.1 mg/g.  She was advised to use 1 g of the cream per vagina at bedtime 2-3 times per week [] Explained to her that continuous use of incontinence pads might be contributing to recurrent urinary tract infections as well as vulvar irritation.  Again offered a trial of overactive bladder medication such as Myrbetriq 25 mg p.o. daily or Gemtesa 75 mg p.o. daily.  She declines for now recommended use of zinc oxide skin barrier cream to the vulva prior to pad change. [] Recommended daily intake of fiber supplement such as Metamucil or Citrucel to improve bowel consistency and regularity.  Recommended use of hand-held bidet spray for perineal washing after bowel movement. [] Discussed importance of optimal glycemic control for bladder and vulvar health.  Her most recent hemoglobin A1c was 6  follow-up in 4 to 6 months

## 2024-09-18 NOTE — DISCUSSION/SUMMARY
[FreeTextEntry1] : Following management plan was outlined after detailed discussion with patient. [] Require recommended checking urine for infection only if she has concerning symptoms such as dysuria, change in urinary frequency baseline urgency/hematuria etc.  She verbalized understanding.  She is asymptomatic.  The collected urine was discarded.  Patient was informed that we are not sending a urine culture today [] Advised patient to call the office if she has any for any concerning symptoms for urinary tract infection.  She is aware that we will do a urine culture and treat her based on there is the results.  She typically responds well to Keflex 500 mg p.o. twice daily for 7 days.  If your infections return, we will restart Keflex 250 mg p.o. daily for 180 days on nitrofurantoin 100 mg p.o. daily [id number] days [] Continue vaginal estradiol cream 0.1 mg/g.  She was advised to use 1 g of the cream per vagina at bedtime 2-3 times per week [] Explained to her that continuous use of incontinence pads might be contributing to recurrent urinary tract infections as well as vulvar irritation.  Again offered a trial of overactive bladder medication such as Myrbetriq 25 mg p.o. daily or Gemtesa 75 mg p.o. daily.  She declines for now recommended use of zinc oxide skin barrier cream to the vulva prior to pad change. [] Recommended daily intake of fiber supplement such as Metamucil or Citrucel to improve bowel consistency and regularity.  Recommended use of hand-held bidet spray for perineal washing after bowel movement. [] Discussed importance of optimal glycemic control for bladder and vulvar health.  Her most recent hemoglobin A1c was 6  follow-up in 4 to 6 months

## 2024-10-29 ENCOUNTER — APPOINTMENT (OUTPATIENT)
Dept: MRI IMAGING | Facility: CLINIC | Age: 74
End: 2024-10-29

## 2024-10-29 PROCEDURE — A9585: CPT | Mod: JW

## 2024-10-29 PROCEDURE — 74183 MRI ABD W/O CNTR FLWD CNTR: CPT | Mod: MH

## 2024-11-01 ENCOUNTER — APPOINTMENT (OUTPATIENT)
Dept: SURGICAL ONCOLOGY | Facility: CLINIC | Age: 74
End: 2024-11-01
Payer: MEDICARE

## 2024-11-01 PROCEDURE — 99213 OFFICE O/P EST LOW 20 MIN: CPT

## 2024-11-07 ENCOUNTER — APPOINTMENT (OUTPATIENT)
Dept: RHEUMATOLOGY | Facility: CLINIC | Age: 74
End: 2024-11-07
Payer: MEDICARE

## 2024-11-07 VITALS
HEART RATE: 75 BPM | SYSTOLIC BLOOD PRESSURE: 130 MMHG | DIASTOLIC BLOOD PRESSURE: 78 MMHG | TEMPERATURE: 98.1 F | BODY MASS INDEX: 32.78 KG/M2 | WEIGHT: 185 LBS | OXYGEN SATURATION: 98 % | HEIGHT: 63 IN

## 2024-11-07 DIAGNOSIS — M70.61 TROCHANTERIC BURSITIS, RIGHT HIP: ICD-10-CM

## 2024-11-07 DIAGNOSIS — M53.86 OTHER SPECIFIED DORSOPATHIES, LUMBAR REGION: ICD-10-CM

## 2024-11-07 DIAGNOSIS — M70.62 TROCHANTERIC BURSITIS, LEFT HIP: ICD-10-CM

## 2024-11-07 DIAGNOSIS — M76.30 ILIOTIBIAL BAND SYNDROME, UNSPECIFIED LEG: ICD-10-CM

## 2024-11-07 DIAGNOSIS — K86.2 CYST OF PANCREAS: ICD-10-CM

## 2024-11-07 PROCEDURE — 99214 OFFICE O/P EST MOD 30 MIN: CPT | Mod: 25

## 2024-11-07 PROCEDURE — 20610 DRAIN/INJ JOINT/BURSA W/O US: CPT | Mod: 50

## 2024-11-07 RX ORDER — TRIAMCINOLONE ACETONIDE 80 MG/ML
80 INJECTION, SUSPENSION INTRA-ARTICULAR; INTRAMUSCULAR
Qty: 16 | Refills: 0 | Status: COMPLETED | OUTPATIENT
Start: 2024-11-07

## 2024-11-07 RX ADMIN — TRIAMCINOLONE ACETONIDE 0 MG/ML: 80 INJECTION, SUSPENSION INTRA-ARTICULAR; INTRAMUSCULAR at 00:00

## 2024-11-25 RX ORDER — CEPHALEXIN 500 MG/1
500 CAPSULE ORAL
Qty: 14 | Refills: 0 | Status: ACTIVE | COMMUNITY
Start: 2024-11-25 | End: 1900-01-01

## 2024-12-02 RX ORDER — METHENAMINE HIPPURATE 1 G/1
1 TABLET ORAL
Qty: 180 | Refills: 0 | Status: ACTIVE | COMMUNITY
Start: 2024-12-02 | End: 1900-01-01

## 2025-01-15 ENCOUNTER — APPOINTMENT (OUTPATIENT)
Dept: UROGYNECOLOGY | Facility: CLINIC | Age: 75
End: 2025-01-15

## 2025-01-15 DIAGNOSIS — K59.00 CONSTIPATION, UNSPECIFIED: ICD-10-CM

## 2025-01-15 DIAGNOSIS — N39.0 URINARY TRACT INFECTION, SITE NOT SPECIFIED: ICD-10-CM

## 2025-01-15 DIAGNOSIS — N95.2 POSTMENOPAUSAL ATROPHIC VAGINITIS: ICD-10-CM

## 2025-01-15 PROCEDURE — 99214 OFFICE O/P EST MOD 30 MIN: CPT

## 2025-01-15 RX ORDER — ESTRADIOL 0.1 MG/G
0.1 CREAM VAGINAL
Qty: 42.5 | Refills: 2 | Status: ACTIVE | COMMUNITY
Start: 2025-01-15 | End: 1900-01-01

## 2025-02-03 ENCOUNTER — NON-APPOINTMENT (OUTPATIENT)
Age: 75
End: 2025-02-03

## 2025-03-03 RX ORDER — CEPHALEXIN 250 MG/1
250 CAPSULE ORAL
Qty: 90 | Refills: 0 | Status: ACTIVE | COMMUNITY
Start: 2025-03-03 | End: 1900-01-01

## 2025-03-03 RX ORDER — CEPHALEXIN 500 MG/1
500 CAPSULE ORAL
Qty: 14 | Refills: 0 | Status: ACTIVE | COMMUNITY
Start: 2025-03-03 | End: 1900-01-01

## 2025-03-03 RX ORDER — PHENAZOPYRIDINE 200 MG/1
200 TABLET, FILM COATED ORAL 3 TIMES DAILY
Qty: 15 | Refills: 0 | Status: ACTIVE | COMMUNITY
Start: 2025-03-03 | End: 1900-01-01

## 2025-03-12 ENCOUNTER — APPOINTMENT (OUTPATIENT)
Dept: UROGYNECOLOGY | Facility: CLINIC | Age: 75
End: 2025-03-12

## 2025-03-12 DIAGNOSIS — R31.0 GROSS HEMATURIA: ICD-10-CM

## 2025-03-12 DIAGNOSIS — R31.9 HEMATURIA, UNSPECIFIED: ICD-10-CM

## 2025-03-12 DIAGNOSIS — N39.0 URINARY TRACT INFECTION, SITE NOT SPECIFIED: ICD-10-CM

## 2025-03-12 PROCEDURE — 99214 OFFICE O/P EST MOD 30 MIN: CPT

## 2025-03-12 RX ORDER — SULFAMETHOXAZOLE AND TRIMETHOPRIM 800; 160 MG/1; MG/1
800-160 TABLET ORAL TWICE DAILY
Qty: 14 | Refills: 0 | Status: ACTIVE | COMMUNITY
Start: 2025-03-12 | End: 1900-01-01

## 2025-03-13 ENCOUNTER — APPOINTMENT (OUTPATIENT)
Dept: CT IMAGING | Facility: CLINIC | Age: 75
End: 2025-03-13
Payer: MEDICARE

## 2025-03-13 PROCEDURE — 74178 CT ABD&PLV WO CNTR FLWD CNTR: CPT

## 2025-03-17 RX ORDER — FOSFOMYCIN TROMETHAMINE 3 G/1
3 POWDER ORAL
Qty: 3 | Refills: 0 | Status: ACTIVE | COMMUNITY
Start: 2025-03-17 | End: 1900-01-01

## 2025-03-28 ENCOUNTER — APPOINTMENT (OUTPATIENT)
Age: 75
End: 2025-03-28

## 2025-03-28 LAB
BILIRUB UR QL STRIP: NORMAL
CLARITY UR: CLEAR
GLUCOSE UR-MCNC: NORMAL
HCG UR QL: 0.2 EU/DL
HGB UR QL STRIP.AUTO: NORMAL
KETONES UR-MCNC: NORMAL
LEUKOCYTE ESTERASE UR QL STRIP: NORMAL
NITRITE UR QL STRIP: NORMAL
PH UR STRIP: 5.5
PROT UR STRIP-MCNC: NORMAL
SP GR UR STRIP: 1.02

## 2025-03-28 PROCEDURE — 81003 URINALYSIS AUTO W/O SCOPE: CPT | Mod: QW

## 2025-03-28 PROCEDURE — 99214 OFFICE O/P EST MOD 30 MIN: CPT

## 2025-03-28 RX ORDER — FOSFOMYCIN TROMETHAMINE 3 G/1
3 POWDER ORAL
Qty: 10 | Refills: 0 | Status: ACTIVE | COMMUNITY
Start: 2025-03-28 | End: 1900-01-01

## 2025-04-01 ENCOUNTER — APPOINTMENT (OUTPATIENT)
Dept: UROLOGY | Facility: CLINIC | Age: 75
End: 2025-04-01
Payer: MEDICARE

## 2025-04-01 VITALS
DIASTOLIC BLOOD PRESSURE: 74 MMHG | TEMPERATURE: 97.8 F | HEIGHT: 63 IN | BODY MASS INDEX: 32.78 KG/M2 | HEART RATE: 84 BPM | WEIGHT: 185 LBS | SYSTOLIC BLOOD PRESSURE: 118 MMHG

## 2025-04-01 DIAGNOSIS — Z87.448 PERSONAL HISTORY OF OTHER DISEASES OF URINARY SYSTEM: ICD-10-CM

## 2025-04-01 DIAGNOSIS — R82.998 OTHER ABNORMAL FINDINGS IN URINE: ICD-10-CM

## 2025-04-01 DIAGNOSIS — N39.0 URINARY TRACT INFECTION, SITE NOT SPECIFIED: ICD-10-CM

## 2025-04-01 PROCEDURE — 99204 OFFICE O/P NEW MOD 45 MIN: CPT

## 2025-04-01 RX ORDER — FOSFOMYCIN TROMETHAMINE 3 G/1
3 POWDER ORAL
Qty: 1 | Refills: 0 | Status: COMPLETED | COMMUNITY
Start: 2025-03-27 | End: 2025-04-01

## 2025-04-23 ENCOUNTER — APPOINTMENT (OUTPATIENT)
Dept: RHEUMATOLOGY | Facility: CLINIC | Age: 75
End: 2025-04-23
Payer: MEDICARE

## 2025-04-23 ENCOUNTER — NON-APPOINTMENT (OUTPATIENT)
Age: 75
End: 2025-04-23

## 2025-04-23 VITALS
SYSTOLIC BLOOD PRESSURE: 118 MMHG | HEIGHT: 63 IN | BODY MASS INDEX: 32.78 KG/M2 | HEART RATE: 88 BPM | OXYGEN SATURATION: 98 % | TEMPERATURE: 97.6 F | DIASTOLIC BLOOD PRESSURE: 68 MMHG | WEIGHT: 185 LBS

## 2025-04-23 DIAGNOSIS — K86.2 CYST OF PANCREAS: ICD-10-CM

## 2025-04-23 DIAGNOSIS — M70.61 TROCHANTERIC BURSITIS, RIGHT HIP: ICD-10-CM

## 2025-04-23 DIAGNOSIS — M53.86 OTHER SPECIFIED DORSOPATHIES, LUMBAR REGION: ICD-10-CM

## 2025-04-23 DIAGNOSIS — M70.62 TROCHANTERIC BURSITIS, LEFT HIP: ICD-10-CM

## 2025-04-23 PROCEDURE — 20610 DRAIN/INJ JOINT/BURSA W/O US: CPT | Mod: 50

## 2025-04-23 PROCEDURE — 99214 OFFICE O/P EST MOD 30 MIN: CPT | Mod: 25

## 2025-04-23 RX ORDER — TRIAMCINOLONE ACETONIDE 80 MG/ML
80 INJECTION, SUSPENSION INTRA-ARTICULAR; INTRAMUSCULAR
Qty: 16 | Refills: 0 | Status: COMPLETED | OUTPATIENT
Start: 2025-04-23

## 2025-04-23 RX ORDER — DICLOFENAC SODIUM 20 MG/G
2 SOLUTION TOPICAL
Qty: 1 | Refills: 3 | Status: ACTIVE | COMMUNITY
Start: 2025-04-23 | End: 1900-01-01

## 2025-04-23 RX ADMIN — TRIAMCINOLONE ACETONIDE 0 MG/ML: 80 INJECTION, SUSPENSION INTRA-ARTICULAR; INTRAMUSCULAR at 00:00

## 2025-05-08 ENCOUNTER — APPOINTMENT (OUTPATIENT)
Dept: UROLOGY | Facility: CLINIC | Age: 75
End: 2025-05-08
Payer: MEDICARE

## 2025-05-08 VITALS
SYSTOLIC BLOOD PRESSURE: 138 MMHG | HEIGHT: 63 IN | TEMPERATURE: 97.3 F | DIASTOLIC BLOOD PRESSURE: 74 MMHG | WEIGHT: 185 LBS | HEART RATE: 79 BPM | BODY MASS INDEX: 32.78 KG/M2

## 2025-05-08 DIAGNOSIS — Z87.448 PERSONAL HISTORY OF OTHER DISEASES OF URINARY SYSTEM: ICD-10-CM

## 2025-05-08 DIAGNOSIS — N39.0 URINARY TRACT INFECTION, SITE NOT SPECIFIED: ICD-10-CM

## 2025-05-08 LAB
APPEARANCE: CLEAR
BILIRUBIN URINE: NEGATIVE
BLOOD URINE: NEGATIVE
COLOR: YELLOW
GLUCOSE QUALITATIVE U: NEGATIVE
KETONES URINE: NEGATIVE
LEUKOCYTE ESTERASE URINE: NEGATIVE
NITRITE URINE: NEGATIVE
PH URINE: 6
PROTEIN URINE: NEGATIVE
SPECIFIC GRAVITY URINE: 1.01
UROBILINOGEN URINE: 0.2 (ref 0.2–?)

## 2025-05-08 PROCEDURE — 99214 OFFICE O/P EST MOD 30 MIN: CPT

## 2025-05-08 RX ORDER — POTASSIUM CITRATE 15 MEQ/1
15 MEQ TABLET, EXTENDED RELEASE ORAL
Qty: 60 | Refills: 3 | Status: ACTIVE | COMMUNITY
Start: 2025-05-08 | End: 1900-01-01

## 2025-05-13 LAB
APPEARANCE: CLEAR
BACTERIA UR CULT: ABNORMAL
BACTERIA: NEGATIVE /HPF
BILIRUBIN URINE: NEGATIVE
BLOOD URINE: NEGATIVE
CAST: 1 /LPF
COLOR: YELLOW
EPITHELIAL CELLS: 3 /HPF
GLUCOSE QUALITATIVE U: NEGATIVE MG/DL
KETONES URINE: NEGATIVE MG/DL
LEUKOCYTE ESTERASE URINE: NEGATIVE
MICROSCOPIC-UA: NORMAL
NITRITE URINE: NEGATIVE
PH URINE: 6
PROTEIN URINE: NEGATIVE MG/DL
RED BLOOD CELLS URINE: 4 /HPF
SPECIFIC GRAVITY URINE: 1.02
UROBILINOGEN URINE: 0.2 MG/DL
WHITE BLOOD CELLS URINE: 0 /HPF

## 2025-05-28 RX ORDER — FOSFOMYCIN TROMETHAMINE 3 G/1
3 POWDER ORAL
Qty: 8 | Refills: 0 | Status: DISCONTINUED | COMMUNITY
Start: 2025-05-28 | End: 2025-05-28

## 2025-05-28 RX ORDER — CEPHALEXIN 250 MG/1
250 CAPSULE ORAL
Qty: 90 | Refills: 0 | Status: ACTIVE | COMMUNITY
Start: 2025-05-28 | End: 1900-01-01

## 2025-06-02 ENCOUNTER — APPOINTMENT (OUTPATIENT)
Dept: INFECTIOUS DISEASE | Facility: CLINIC | Age: 75
End: 2025-06-02
Payer: MEDICARE

## 2025-06-02 VITALS
BODY MASS INDEX: 32.44 KG/M2 | RESPIRATION RATE: 16 BRPM | SYSTOLIC BLOOD PRESSURE: 110 MMHG | TEMPERATURE: 98.2 F | OXYGEN SATURATION: 93 % | WEIGHT: 190 LBS | DIASTOLIC BLOOD PRESSURE: 62 MMHG | HEART RATE: 83 BPM | HEIGHT: 64 IN

## 2025-06-02 DIAGNOSIS — N39.0 URINARY TRACT INFECTION, SITE NOT SPECIFIED: ICD-10-CM

## 2025-06-02 PROCEDURE — 99203 OFFICE O/P NEW LOW 30 MIN: CPT

## 2025-06-18 RX ORDER — SULFAMETHOXAZOLE AND TRIMETHOPRIM 800; 160 MG/1; MG/1
800-160 TABLET ORAL TWICE DAILY
Qty: 14 | Refills: 0 | Status: ACTIVE | COMMUNITY
Start: 2025-06-18 | End: 1900-01-01

## 2025-06-28 ENCOUNTER — NON-APPOINTMENT (OUTPATIENT)
Age: 75
End: 2025-06-28

## 2025-06-30 ENCOUNTER — APPOINTMENT (OUTPATIENT)
Dept: UROGYNECOLOGY | Facility: CLINIC | Age: 75
End: 2025-06-30

## 2025-06-30 PROCEDURE — 99214 OFFICE O/P EST MOD 30 MIN: CPT

## 2025-06-30 RX ORDER — PHENAZOPYRIDINE 200 MG/1
200 TABLET, FILM COATED ORAL 3 TIMES DAILY
Qty: 15 | Refills: 0 | Status: ACTIVE | COMMUNITY
Start: 2025-06-30 | End: 1900-01-01

## 2025-07-10 ENCOUNTER — APPOINTMENT (OUTPATIENT)
Dept: UROLOGY | Facility: CLINIC | Age: 75
End: 2025-07-10

## 2025-07-25 ENCOUNTER — APPOINTMENT (OUTPATIENT)
Dept: UROLOGY | Facility: CLINIC | Age: 75
End: 2025-07-25
Payer: COMMERCIAL

## 2025-07-25 VITALS
TEMPERATURE: 97.3 F | HEIGHT: 64 IN | HEART RATE: 98 BPM | BODY MASS INDEX: 32.44 KG/M2 | WEIGHT: 190 LBS | DIASTOLIC BLOOD PRESSURE: 71 MMHG | SYSTOLIC BLOOD PRESSURE: 124 MMHG

## 2025-07-25 DIAGNOSIS — Z87.448 PERSONAL HISTORY OF OTHER DISEASES OF URINARY SYSTEM: ICD-10-CM

## 2025-07-25 PROCEDURE — 99203 OFFICE O/P NEW LOW 30 MIN: CPT

## 2025-07-28 ENCOUNTER — LABORATORY RESULT (OUTPATIENT)
Age: 75
End: 2025-07-28

## 2025-07-28 ENCOUNTER — APPOINTMENT (OUTPATIENT)
Dept: INFECTIOUS DISEASE | Facility: CLINIC | Age: 75
End: 2025-07-28
Payer: MEDICARE

## 2025-07-28 VITALS
OXYGEN SATURATION: 98 % | DIASTOLIC BLOOD PRESSURE: 68 MMHG | TEMPERATURE: 97.9 F | HEART RATE: 94 BPM | SYSTOLIC BLOOD PRESSURE: 124 MMHG

## 2025-07-28 DIAGNOSIS — N39.0 URINARY TRACT INFECTION, SITE NOT SPECIFIED: ICD-10-CM

## 2025-07-28 DIAGNOSIS — R82.998 OTHER ABNORMAL FINDINGS IN URINE: ICD-10-CM

## 2025-07-28 PROCEDURE — 99214 OFFICE O/P EST MOD 30 MIN: CPT

## 2025-07-31 LAB
ANION GAP SERPL CALC-SCNC: 16 MMOL/L
BUN SERPL-MCNC: 17 MG/DL
CALCIUM SERPL-MCNC: 9.5 MG/DL
CHLORIDE SERPL-SCNC: 99 MMOL/L
CO2 SERPL-SCNC: 24 MMOL/L
CREAT SERPL-MCNC: 0.72 MG/DL
EGFRCR SERPLBLD CKD-EPI 2021: 86 ML/MIN/1.73M2
GLUCOSE SERPL-MCNC: 197 MG/DL
HCT VFR BLD CALC: 42.2 %
HGB BLD-MCNC: 13.4 G/DL
MCHC RBC-ENTMCNC: 31.1 PG
MCHC RBC-ENTMCNC: 31.8 G/DL
MCV RBC AUTO: 97.9 FL
PLATELET # BLD AUTO: 378 K/UL
POTASSIUM SERPL-SCNC: 3.7 MMOL/L
RBC # BLD: 4.31 M/UL
RBC # FLD: 14.3 %
SODIUM SERPL-SCNC: 138 MMOL/L
WBC # FLD AUTO: 7.21 K/UL

## 2025-08-01 RX ORDER — CEFPODOXIME PROXETIL 200 MG/1
200 TABLET, FILM COATED ORAL
Qty: 14 | Refills: 0 | Status: ACTIVE | COMMUNITY
Start: 2025-08-01 | End: 1900-01-01

## 2025-08-04 RX ORDER — CIPROFLOXACIN HYDROCHLORIDE 500 MG/1
500 TABLET, FILM COATED ORAL
Qty: 5 | Refills: 0 | Status: ACTIVE | COMMUNITY
Start: 2025-08-04 | End: 1900-01-01

## 2025-09-08 ENCOUNTER — APPOINTMENT (OUTPATIENT)
Dept: INFECTIOUS DISEASE | Facility: CLINIC | Age: 75
End: 2025-09-08
Payer: MEDICARE

## 2025-09-08 ENCOUNTER — LABORATORY RESULT (OUTPATIENT)
Age: 75
End: 2025-09-08

## 2025-09-08 VITALS
SYSTOLIC BLOOD PRESSURE: 122 MMHG | OXYGEN SATURATION: 98 % | DIASTOLIC BLOOD PRESSURE: 66 MMHG | TEMPERATURE: 97.5 F | HEART RATE: 86 BPM

## 2025-09-08 DIAGNOSIS — N39.0 URINARY TRACT INFECTION, SITE NOT SPECIFIED: ICD-10-CM

## 2025-09-08 PROCEDURE — 99214 OFFICE O/P EST MOD 30 MIN: CPT

## 2025-09-09 LAB
APPEARANCE: CLEAR
BILIRUBIN URINE: NEGATIVE
BLOOD URINE: NEGATIVE
COLOR: YELLOW
GLUCOSE QUALITATIVE U: NEGATIVE MG/DL
KETONES URINE: ABNORMAL MG/DL
LEUKOCYTE ESTERASE URINE: ABNORMAL
NITRITE URINE: NEGATIVE
PH URINE: 5.5
PROTEIN URINE: NORMAL MG/DL
SPECIFIC GRAVITY URINE: 1.02
UROBILINOGEN URINE: 0.2 MG/DL

## 2025-09-10 ENCOUNTER — NON-APPOINTMENT (OUTPATIENT)
Age: 75
End: 2025-09-10

## 2025-09-14 ENCOUNTER — NON-APPOINTMENT (OUTPATIENT)
Age: 75
End: 2025-09-14

## 2025-09-15 ENCOUNTER — NON-APPOINTMENT (OUTPATIENT)
Age: 75
End: 2025-09-15

## 2025-09-15 RX ORDER — AMOXICILLIN AND CLAVULANATE POTASSIUM 500; 125 MG/1; MG/1
500-125 TABLET, FILM COATED ORAL
Qty: 6 | Refills: 2 | Status: ACTIVE | COMMUNITY
Start: 2025-09-15 | End: 1900-01-01